# Patient Record
Sex: MALE | Race: BLACK OR AFRICAN AMERICAN | NOT HISPANIC OR LATINO | Employment: FULL TIME | URBAN - METROPOLITAN AREA
[De-identification: names, ages, dates, MRNs, and addresses within clinical notes are randomized per-mention and may not be internally consistent; named-entity substitution may affect disease eponyms.]

---

## 2021-05-22 ENCOUNTER — APPOINTMENT (EMERGENCY)
Dept: ULTRASOUND IMAGING | Facility: HOSPITAL | Age: 56
End: 2021-05-22
Payer: COMMERCIAL

## 2021-05-22 ENCOUNTER — HOSPITAL ENCOUNTER (EMERGENCY)
Facility: HOSPITAL | Age: 56
Discharge: HOME | End: 2021-05-22
Attending: EMERGENCY MEDICINE | Admitting: EMERGENCY MEDICINE
Payer: COMMERCIAL

## 2021-05-22 VITALS
WEIGHT: 252.87 LBS | SYSTOLIC BLOOD PRESSURE: 135 MMHG | BODY MASS INDEX: 32.45 KG/M2 | OXYGEN SATURATION: 97 % | DIASTOLIC BLOOD PRESSURE: 77 MMHG | RESPIRATION RATE: 16 BRPM | HEART RATE: 72 BPM | TEMPERATURE: 99 F | HEIGHT: 74 IN

## 2021-05-22 DIAGNOSIS — M79.605 LEFT LEG PAIN: Primary | ICD-10-CM

## 2021-05-22 PROCEDURE — 99282 EMERGENCY DEPT VISIT SF MDM: CPT | Performed by: EMERGENCY MEDICINE

## 2021-05-22 PROCEDURE — 93971 EXTREMITY STUDY: CPT

## 2021-05-22 PROCEDURE — 99284 EMERGENCY DEPT VISIT MOD MDM: CPT

## 2021-05-22 PROCEDURE — 93971 EXTREMITY STUDY: CPT | Performed by: SURGERY

## 2021-05-22 RX ORDER — METFORMIN HYDROCHLORIDE 750 MG/1
750 TABLET, EXTENDED RELEASE ORAL DAILY
COMMUNITY
Start: 2021-04-05 | End: 2022-04-05

## 2021-05-22 RX ORDER — LANCETS
EACH MISCELLANEOUS
COMMUNITY

## 2021-05-22 RX ORDER — EMPAGLIFLOZIN AND LINAGLIPTIN 25; 5 MG/1; MG/1
TABLET, FILM COATED ORAL
COMMUNITY
Start: 2020-12-15

## 2021-05-22 NOTE — ED PROVIDER NOTES
History  Chief Complaint   Patient presents with    Leg Swelling     C/o posterior left calf pain/swelling starting one week ago  Referred by urgent care for r/o DVT  56 y/o male presents today reporting left calf pain for one week  States he doesn't remember a distinct injury but thought he pulled something  He noted today that it was swollen so he went to urgent care and was referred here for an US to r/o DVT  Denies fever or shortness of breath  History provided by:  Patient  Leg Pain  Location:  Leg  Leg location:  L lower leg  Pain details:     Quality:  Cramping    Radiates to:  Does not radiate    Severity:  Mild    Duration:  1 week    Timing:  Constant    Progression:  Unchanged  Chronicity:  New  Dislocation: no    Prior injury to area:  No  Relieved by:  None tried  Worsened by:  Nothing  Ineffective treatments:  None tried  Associated symptoms: no decreased ROM, no fatigue, no fever, no neck pain, no numbness, no stiffness and no tingling    Risk factors: no concern for non-accidental trauma, no frequent fractures and no known bone disorder        Prior to Admission Medications   Prescriptions Last Dose Informant Patient Reported? Taking? Empagliflozin-linaGLIPtin (Glyxambi) 25-5 MG TABS 5/22/2021 at Unknown time  Yes Yes   Sig: TAKE 1 TABLET BY MOUTH EVERY DAY   Multiple Vitamin (MULTIVITAMIN ADULT PO) 5/22/2021 at Unknown time  Yes Yes   Sig: Take 1 capsule by mouth daily   ONE TOUCH CLUB LANCETS MISC   Yes No   Sig: OneTouch Verio test strips   Olmesartan-amLODIPine-HCTZ (TRIBENZOR PO) 5/22/2021 at Unknown time Self Yes Yes   Sig: Take by mouth   metFORMIN (GLUCOPHAGE-XR) 750 mg 24 hr tablet 5/21/2021 at Unknown time  Yes Yes   Sig: Take 750 mg by mouth daily      Facility-Administered Medications: None       Past Medical History:   Diagnosis Date    Diabetes mellitus ([facility] [address] )     Hypertension        History reviewed  No pertinent surgical history  History reviewed   No pertinent family history  I have reviewed and agree with the history as documented  E-Cigarette/Vaping     E-Cigarette/Vaping Substances     Social History     Tobacco Use    Smoking status: Never Smoker    Smokeless tobacco: Never Used   Substance Use Topics    Alcohol use: Never     Frequency: Never    Drug use: Never       Review of Systems   Constitutional: Negative for chills, fatigue and fever  HENT: Negative for postnasal drip, sore throat and trouble swallowing  Respiratory: Negative for chest tightness and shortness of breath  Gastrointestinal: Negative for abdominal pain  Genitourinary: Negative for dysuria  Musculoskeletal: Negative for neck pain and stiffness  Skin: Negative for rash  Allergic/Immunologic: Negative for immunocompromised state  Neurological: Negative for dizziness, light-headedness and headaches  Psychiatric/Behavioral: Negative for confusion  Physical Exam  Physical Exam  Vitals signs and nursing note reviewed  Constitutional:       Appearance: He is well-developed  HENT:      Head: Normocephalic and atraumatic  Mouth/Throat:      Mouth: Mucous membranes are moist       Pharynx: Uvula midline  Tonsils: No tonsillar exudate  Eyes:      Pupils: Pupils are equal, round, and reactive to light  Neck:      Musculoskeletal: Normal range of motion and neck supple  Cardiovascular:      Rate and Rhythm: Normal rate and regular rhythm  Pulmonary:      Effort: Pulmonary effort is normal       Breath sounds: Normal breath sounds  Abdominal:      General: Bowel sounds are normal       Palpations: Abdomen is soft  Tenderness: There is no abdominal tenderness  There is no guarding or rebound  Musculoskeletal:         General: Tenderness (In the muscle of the left calf ) present  No deformity  Comments: No appreciable swelling in the left lower extremity on my exam   No palpable cord  Neurovascularly intact distally     Skin:     General: Skin is warm and dry  Capillary Refill: Capillary refill takes less than 2 seconds  Neurological:      General: No focal deficit present  Mental Status: He is alert and oriented to person, place, and time  Comments: Patient moving all extremities equally, no focal neuro deficits noted  Psychiatric:         Mood and Affect: Mood normal          Behavior: Behavior normal          Vital Signs  ED Triage Vitals [05/22/21 1618]   Temperature Pulse Respirations Blood Pressure SpO2   99 °F (37 2 °C) 76 16 157/82 98 %      Temp Source Heart Rate Source Patient Position - Orthostatic VS BP Location FiO2 (%)   Oral Monitor Sitting Left arm --      Pain Score       8           Vitals:    05/22/21 1618 05/22/21 1827 05/22/21 1830   BP: 157/82 135/77 135/77   Pulse: 76 75 72   Patient Position - Orthostatic VS: Sitting Lying Lying         Visual Acuity  Visual Acuity      Most Recent Value   L Pupil Size (mm)  3   R Pupil Size (mm)  3          ED Medications  Medications - No data to display    Diagnostic Studies  Results Reviewed     None                 VAS lower limb venous duplex study, unilateral/limited    (Results Pending)              Procedures  Procedures         ED Course                             SBIRT 20yo+      Most Recent Value   SBIRT (24 yo +)   In order to provide better care to our patients, we are screening all of our patients for alcohol and drug use  Would it be okay to ask you these screening questions? Yes Filed at: 05/22/2021 1633   Initial Alcohol Screen: US AUDIT-C    1  How often do you have a drink containing alcohol?  0 Filed at: 05/22/2021 1633   2  How many drinks containing alcohol do you have on a typical day you are drinking? 0 Filed at: 05/22/2021 1633   3a  Male UNDER 65: How often do you have five or more drinks on one occasion? 0 Filed at: 05/22/2021 1633   3b  FEMALE Any Age, or MALE 65+: How often do you have 4 or more drinks on one occassion?   0 Filed at: 05/22/2021 1633   Audit-C Score  0 Filed at: 05/22/2021 1633   SHIRLEY: How many times in the past year have you    Used an illegal drug or used a prescription medication for non-medical reasons? Never Filed at: 05/22/2021 1633                    [facility]  Number of Diagnoses or Management Options  Left leg pain: new and requires workup  Diagnosis management comments: Ultrasound negative for DVT  Likely muscle strain  Recommend stretching, ice, elevation and rest   Follow up with PCP as an outpatient  Return to ED instructions reviewed  Amount and/or Complexity of Data Reviewed  Tests in the radiology section of CPT®: ordered and reviewed    Risk of Complications, Morbidity, and/or Mortality  Presenting problems: low  Diagnostic procedures: low  Management options: low    Patient Progress  Patient progress: stable      Disposition  Final diagnoses:   Left leg pain     Time reflects when diagnosis was documented in both MDM as applicable and the Disposition within this note     Time User Action Codes Description Comment    5/22/2021  7:01 PM Brennen Santamaria [M79 [id number]] Left leg pain       ED Disposition     ED Disposition Condition Date/Time Comment    Discharge Stable Sat May 22, 2021  7:01 PM Taylor Mijares discharge to home/self care              Follow-up Information     Follow up With Specialties Details Why Contact Info Additional [facility] Schedule an appointment as soon as possible for a visit  to get a family doctor if you do not have one [address] [phone]  [address] [place], [place], [address] Emergency Department Emergency Medicine  If symptoms worsen [address] [facility] [address] [place] [address] Emergency Department, [address], [place], [place], [address] Discharge Medication List as of 5/22/2021  7:02 PM      CONTINUE these medications which have NOT CHANGED    Details   Empagliflozin-linaGLIPtin (Glyxambi) 25-5 MG TABS TAKE 1 TABLET BY MOUTH EVERY DAY, Historical Med      metFORMIN (GLUCOPHAGE-XR) 750 mg 24 hr tablet Take 750 mg by mouth daily, Starting Mon 4/5/2021, Until Tue 4/5/2022, Historical Med      Multiple Vitamin (MULTIVITAMIN ADULT PO) Take 1 capsule by mouth daily, Historical Med      Olmesartan-amLODIPine-HCTZ (TRIBENZOR PO) Take by mouth, Historical Med      ONE TOUCH CLUB LANCETS MISC OneTouch Verio test strips, Historical Med           No discharge procedures on file      PDMP Review     None          ED Provider  Electronically Signed by           Kinsey Joseph DO  05/22/21 9897

## 2025-05-20 ENCOUNTER — HOSPITAL ENCOUNTER (OUTPATIENT)
Dept: RADIOLOGY | Facility: HOSPITAL | Age: 60
Discharge: HOME | End: 2025-05-20
Attending: STUDENT IN AN ORGANIZED HEALTH CARE EDUCATION/TRAINING PROGRAM
Payer: COMMERCIAL

## 2025-05-20 ENCOUNTER — OFFICE VISIT (OUTPATIENT)
Dept: OBGYN CLINIC | Facility: CLINIC | Age: 60
End: 2025-05-20
Payer: COMMERCIAL

## 2025-05-20 VITALS — HEIGHT: 74 IN | BODY MASS INDEX: 28.93 KG/M2 | WEIGHT: 225.4 LBS

## 2025-05-20 DIAGNOSIS — M25.511 RIGHT SHOULDER PAIN, UNSPECIFIED CHRONICITY: ICD-10-CM

## 2025-05-20 DIAGNOSIS — M75.81 RIGHT ROTATOR CUFF TENDINITIS: ICD-10-CM

## 2025-05-20 DIAGNOSIS — M75.21 BICEPS TENDONITIS, RIGHT: Primary | ICD-10-CM

## 2025-05-20 DIAGNOSIS — M75.41 SUBACROMIAL IMPINGEMENT OF RIGHT SHOULDER: ICD-10-CM

## 2025-05-20 PROCEDURE — 99204 OFFICE O/P NEW MOD 45 MIN: CPT | Performed by: STUDENT IN AN ORGANIZED HEALTH CARE EDUCATION/TRAINING PROGRAM

## 2025-05-20 PROCEDURE — 73030 X-RAY EXAM OF SHOULDER: CPT

## 2025-05-20 RX ORDER — MELOXICAM 15 MG/1
15 TABLET ORAL DAILY
Qty: 30 TABLET | Refills: 2 | Status: SHIPPED | OUTPATIENT
Start: 2025-05-20 | End: 2025-08-18

## 2025-05-20 NOTE — PROGRESS NOTES
Initial Orthopedic Sports Medicine Office Visit    Assessment:  Dion Mtz is a 60 y.o.  RHD male  with a history and physical examination consistent with right shoulder rotator cuff tendinitis and long head of the biceps tendinitis as well as subacromial impingement.      Plan:   Assessment & Plan  Biceps tendonitis, right  Discussed x ray and physical exam findings of the right shoulder at length with patient in the office today. Discussed physical exam findings consistent with right shoulder long head biceps tendonitis. Discussed conservative treatment with activity modification, RICE therapies, and oral anti-inflammatories as needed for persistent pain and inflammation. Also discussed physical therapy to work on strengthening of the muscles around the shoulder as well as continued progression of range of motion. Patient expressed understanding. Referral placed to PT today. Also discussed Meloxicam for persistent pain and inflammation of the right shoulder. Discussed side effects and risks of medication. Patient agreeable. Prescription and instruction for oral use provided to the patient today. Patient agreeable to the above therapies. He will follow up in 6 weeks for reevaluation of the right shoulder. If no improvement at this time, can consider MRI of the right shoulder for further evaluation of symptoms. All of patients questions were answered in the office today.     Orders:    XR shoulder 2+ vw right; Future    meloxicam (Mobic) 15 mg tablet; Take 1 tablet (15 mg total) by mouth daily    Ambulatory Referral to Physical Therapy; Future    Right rotator cuff tendinitis         Subacromial impingement of right shoulder  I discussed how PT to address his scapular mechanics would help with his subacromial impingement         CC: right shoulder pain    History of Present Illness:  Dion Mtz is a 60 y.o. male  who presents to the office for evaluation of his right shoulder. Patient notes pain to the  anterior aspect of the right shoulder with radiation to the right elbow for the last 6 months. 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 Further, he notes increased pain with lifting. He notes use of tylenol and activity modification with moderate relief of pain. He denies previous surgery, injury, or injection. He denies numbness and tingling of the right upper extremity.     PMHx:   Past Medical History:   Diagnosis Date    Diabetes mellitus (HCC)     Hypertension      PSHx: History reviewed. No pertinent surgical history.  Medications: Medications Ordered Prior to Encounter[1]  Allergies: Allergies[2]   Social History: Employed as a .  Family History: History reviewed. No pertinent family history.   Review of systems: ROS is negative other than that noted in the HPI.  Constitutional: Negative for fatigue and fever.   HENT: Negative for sore throat.    Respiratory: Negative for shortness of breath.    Cardiovascular: Negative for chest pain.   Gastrointestinal: Negative for abdominal pain.   Endocrine: Negative for cold intolerance and heat intolerance.   Genitourinary: Negative for flank pain.   Musculoskeletal: Negative for back pain.   Skin: Negative for rash.   Allergic/Immunologic: Negative for immunocompromised state.   Neurological: Negative for dizziness.   Psychiatric/Behavioral: Negative for agitation.       Comprehensive Physical Examination:  There were no vitals filed for this visit.     General Appearance: The patient is a well developed, well nourished male  in no apparent distress.  Orientation:  The patient is alert and interactive.  Oriented to time, place and person.  Mood and Affect:  The patient has normal mood and affect.    Shoulder focused exam:       RIGHT LEFT    Scapula Atrophy Negative Negative     Winging Negative Negative     Protraction Negative Negative    Rotator cuff SS 5/5 5/5     IS 5/5 5/5     SubS 5/5 5/5    ROM     170      ER0 60 60     ER90 90    90     IR90 45    45     IRb L5     L5    TTP: AC Negative Negative     Biceps Positive Negative     SC Joint Negative Negative     Scapular Spine Negative Negative     Proximal Humerus Negative Negative    Special Tests: O'Briens Positive Negative     Cross body Adduction Negative Negative     Speeds  Positive Negative     Silvino's Positive Negative     Neer Negative Negative     Menezes Positive Negative     Bear Hug Negative Negative    Instability: Apprehension & relocation not tested not tested     Load & shift not tested not tested               UE NV Exam: +2 Radial pulses bilaterally  Sensation intact to light touch C5-T1 bilaterally, Radial/median/ulnar nerve motor intact    Bilateral elbow, wrist, and and forearm ROM full, painless with passive ROM, no ttp or crepitance throughout extremities below shoulder joint    Cervical ROM is full without pain, numbness or tingling. Negative Spurling.    Radiographic Imaging: I personally reviewed and interpreted 4 radiographs of his right shoulder including AP internal rotation, Grashey, axillary lateral, and scapular Y views which were taken in the office and reviewed with the patient in detail.  The shoulder is reduced.  There is no evidence of glenohumeral arthritis.  There is Mild to moderate DJD of his AC joint.  No significant acromiohumeral interval narrowing. No bony pathology is noted to be present.    Scribe Attestation      I,:  Karla Sahu PA-C am acting as a scribe while in the presence of the attending physician.:       I,:  William Waters MD personally performed the services described in this documentation    as scribed in my presence.:                   [1]   Current Outpatient Medications on File Prior to Visit   Medication Sig Dispense Refill    Empagliflozin-linaGLIPtin (Glyxambi) 25-5 MG TABS       Multiple Vitamin (MULTIVITAMIN ADULT PO) Take 1 capsule by mouth in the morning.      Olmesartan-amLODIPine-HCTZ  (TRIBENZOR PO) Take by mouth      ONE TOUCH CLUB LANCETS MISC       metFORMIN (GLUCOPHAGE-XR) 750 mg 24 hr tablet Take 750 mg by mouth daily       No current facility-administered medications on file prior to visit.   [2] No Known Allergies

## 2025-05-20 NOTE — PATIENT INSTRUCTIONS
Take meloxicam 15 mg daily with food or water as needed for pain / inflammation  Take tylenol 1000 mg every 8 hours as needed for pain

## 2025-05-20 NOTE — ASSESSMENT & PLAN NOTE
Discussed x ray and physical exam findings of the right shoulder at length with patient in the office today. Discussed physical exam findings consistent with right shoulder long head biceps tendonitis. Discussed conservative treatment with activity modification, RICE therapies, and oral anti-inflammatories as needed for persistent pain and inflammation. Also discussed physical therapy to work on strengthening of the muscles around the shoulder as well as continued progression of range of motion. Patient expressed understanding. Referral placed to PT today. Also discussed Meloxicam for persistent pain and inflammation of the right shoulder. Discussed side effects and risks of medication. Patient agreeable. Prescription and instruction for oral use provided to the patient today. Patient agreeable to the above therapies. He will follow up in 6 weeks for reevaluation of the right shoulder. If no improvement at this time, can consider MRI of the right shoulder for further evaluation of symptoms. All of patients questions were answered in the office today.     Orders:    XR shoulder 2+ vw right; Future    meloxicam (Mobic) 15 mg tablet; Take 1 tablet (15 mg total) by mouth daily    Ambulatory Referral to Physical Therapy; Future

## 2025-05-27 ENCOUNTER — EVALUATION (OUTPATIENT)
Dept: PHYSICAL THERAPY | Facility: CLINIC | Age: 60
End: 2025-05-27
Attending: STUDENT IN AN ORGANIZED HEALTH CARE EDUCATION/TRAINING PROGRAM
Payer: COMMERCIAL

## 2025-05-27 DIAGNOSIS — M54.12 CERVICAL RADICULOPATHY: Primary | ICD-10-CM

## 2025-05-27 DIAGNOSIS — M75.21 BICEPS TENDONITIS, RIGHT: ICD-10-CM

## 2025-05-27 PROCEDURE — 97162 PT EVAL MOD COMPLEX 30 MIN: CPT | Performed by: PHYSICAL THERAPIST

## 2025-05-27 PROCEDURE — 97112 NEUROMUSCULAR REEDUCATION: CPT | Performed by: PHYSICAL THERAPIST

## 2025-05-27 NOTE — PROGRESS NOTES
PT Evaluation     Today's date: 2025  Patient name: Dion Mtz  : 1965  MRN: [id number]  Referring provider: William Waters MD  Dx:   Encounter Diagnosis     ICD-10-CM    1. Cervical radiculopathy  M54.12       2. Biceps tendonitis, right  M75.21           Start Time: 0730  Stop Time: 08  Total time in clinic (min): 55 minutes    Assessment  Impairments: abnormal muscle firing, abnormal muscle tone, abnormal or restricted ROM, abnormal movement, impaired physical strength, lacks appropriate home exercise program, pain with function and poor posture     Assessment details: Dion Mtz is a pleasant 60 y.o. male who presents with s/s consistent with cervical radiculopathy and R RTC tendinopathy.  The patient's greatest concerns are worry over not knowing what's wrong, concern at no signs of improvement, and fear of not being able to keep active.      No further referral appears necessary at this time based upon examination results.    Primary movement impairment diagnosis of postural dysfunction resulting in pathoanatomical symptoms of Biceps tendonitis, right  (primary encounter diagnosis) and limiting his ability to carry, drive, exercise or recreation, go to work, lift, look up, perform household chores, perform yard work, reach overhead, sit, sleep, turn to look over shoulder, and wash behind the back.    Impairments include:  1) postural dysfunction  2) post capsule tightness  3) weakness in postural and RTC mm  4) neural tension    Etiologic factors include repetitive poor body mechanics.    Discussed risks, benefits, and alternatives to treatment, and answered all patient questions to patient satisfaction.  Understanding of Dx/Px/POC: good     Prognosis: good    Goals  Impairment Goals 4-6 weeks  - Decrease pain to <3/10  - Improve shoulder AROM to equal to the unaffected upper extremity  - Increase shoulder strength to 4+/5 throughout  - Increase scapular strength to 4+/5  throughout    Functional Goals 6-8 weeks  - Return to Prior Level of Function  - Patient will be independent with HEP  - Patient will be able to reach overhead without increased pain/compensation/difficulty  - Patient will be able to drive without increased pain/compensation/difficulty   - Patient will be able to lift with proper mechanics   - Patient will be able to work without increase in discomfort    Plan  Patient would benefit from: skilled physical therapy  Planned modality interventions: cryotherapy, traction and thermotherapy: hydrocollator packs    Planned therapy interventions: abdominal trunk stabilization, behavior modification, body mechanics training, breathing training, flexibility, functional ROM exercises, home exercise program, joint mobilization, manual therapy, massage, Gibbons taping, muscle pump exercises, neuromuscular re-education, patient education, postural training, strengthening, stretching, therapeutic activities, therapeutic exercise and therapeutic training    Frequency: 2x week  Duration in weeks: 8  Treatment plan discussed with: patient  Plan details: Prognosis above is given PT services 2x/week tapering to 1x/week over the next 2 months and home program adherence.        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 Recently, over the past 6-9 months it started bothering his shoulder and arm. In the last 45 days it has gotten progressively worse. He wants to stop being in pain. He saw Dr. PASTRANA who diagnosed him with RTC tendonitis and biceps tendonitis.  PAIN LOCATION/DESCRIPTORS: R shoulder into R forearm. He also has tingling in anterior forearm and upper shoulder  AGGRAVATING FACTORS:   lifting something light, driving, cold, lifting overhead, heavy lifting, looking down or head position, exercising, laying on the arm  EASES: sleeve  DAY PATTERN: worse when he sleeps at night  IMAGING:  x-ray negative  SPECIAL QUESTIONS:    Dion denies a new onset of Dizziness, Dysphagia, Dysarthria, Drop attacks, Diplopia, Nausea, Ataxia, Constant night pain, History of cancer, and Saddle anesthesia .  PATIENT GOALS: Patient wishes to resolve his pain in his arm.    Patient Goals  Patient goals for therapy: decreased pain, increased motion, increased strength, independence with ADLs/IADLs and return to sport/leisure activities    Pain  Current pain ratin  At best pain ratin  At worst pain rating: 10  Location: R arm  Quality: radiating and dull ache  Progression: worsening          Objective     Postural Observations  Seated posture: poor  Standing posture: fair    Additional Postural Observation Details  No pain at rest    Active Range of Motion   Cervical/Thoracic Spine       Cervical    Flexion: 35 degrees  Restriction level: moderate  Extension: 25 degrees     with pain Restriction level: moderate  Left lateral flexion: 30 degrees      Right lateral flexion: 30 degrees      Left rotation: 65 degrees  Right rotation: 60 degrees       Thoracic    Flexion:  WFL  Extension:  with pain Restriction level: moderate  Left rotation:  Restriction level: minimal  Right rotation:  Restriction level: minimal    Additional Active Range of Motion Details  Pain in shoulder with cervical extension    Passive Range of Motion     Right Shoulder   Flexion: WFL  Abduction: 145 degrees with pain  External rotation 90°: WFL  Internal rotation 90°: 35 degrees with pain    Strength/Myotome Testing     Left Shoulder     Planes of Motion   Flexion: 4   Abduction: 4   External rotation at 0°: 4     Right Shoulder     Planes of Motion   Flexion: 4- (pain in shoulder)   Abduction: 4-   External rotation at 0°: 4- (pain)      Isolated Muscles   Lower trapezius: 4   Middle deltoid: 4- (asterisks pain)   Upper trapezius: 4+     Left Elbow   Flexion: 4  Extension: 4    Right Elbow   Flexion: 4+  Extension: 4+    Left Wrist/Hand   Wrist extension: 4  Wrist flexion: 4  Thumb extension: 4    Right Wrist/Hand   Wrist extension: 4  Wrist flexion: 4  Thumb extension: 4    Tests   Cervical   Positive vertical compression and cervical distraction test.  Negative alar ligament test and Sharp-Bob test.     Left   Positive Spurling's Test A.     Right   Positive Spurling's Test A.     Left Shoulder   Negative ULTT1.     Right Shoulder   Positive Hawkin's, Neer's, passive horizontal adduction, ULTT1 and Peng's.   Negative painful arc, Speed's, ULTT3, ULTT4 and Yergason's.     Lumbar   Positive vertical compression .               POC Expires Auth Status Start Date Expiration Date PT Visit Limit     No auth      Date        Used        Remaining           Diagnosis: cervical radiculopathy, RTC tendinopathy   Precautions:    Primary Goals: 1) postural dysfunction  2) post capsule tightness  3) weakness in postural and RTC mm  4) neural tension   *asterisks by exercise = given for HEP   Manuals 5/27       PROM shoulder     DO FOTO   Post shoudler mobs        Cervical mobs                        There Ex        UBE        Post shoulder S        Sleeper S        Doorway S        Median nerve glide        UT S        Levator S        First rib self mob with belt                Neuro Re-Ed        Scap retraction* 5x 5sec       Chin tucks* 2 sec x 5       Prone ITY        Chin tuck with OP into ext        TB row/ext        SL series        TB ER/IR                                        Patient education Diagnosis, prognosis, activity modification, towel roll for posture with sitting        Re-evaluation              Ther Act                                         Modalities

## 2025-05-29 ENCOUNTER — OFFICE VISIT (OUTPATIENT)
Dept: PHYSICAL THERAPY | Facility: CLINIC | Age: 60
End: 2025-05-29
Attending: STUDENT IN AN ORGANIZED HEALTH CARE EDUCATION/TRAINING PROGRAM
Payer: COMMERCIAL

## 2025-05-29 DIAGNOSIS — M75.21 BICEPS TENDONITIS, RIGHT: ICD-10-CM

## 2025-05-29 DIAGNOSIS — M54.12 CERVICAL RADICULOPATHY: Primary | ICD-10-CM

## 2025-05-29 PROCEDURE — 97140 MANUAL THERAPY 1/> REGIONS: CPT

## 2025-05-29 PROCEDURE — 97110 THERAPEUTIC EXERCISES: CPT

## 2025-05-29 NOTE — PROGRESS NOTES
Daily Note     Today's date: 2025  Patient name: Dion Mtz  : 1965  MRN: [id number]  Referring provider: William Waters MD  Dx:   Encounter Diagnosis     ICD-10-CM    1. Cervical radiculopathy  M54.12       2. Biceps tendonitis, right  M75.21           Start Time: 0915  Stop Time: 1000  Total time in clinic (min): 45 minutes    Subjective: Patient states the exercises he got at his eval have been helping so he feels he is on the right path.       Objective: See treatment diary below      Assessment: Initiated outlined program. Patient challenged with relaxation to begin session for passive range, but once he stopped guarding he did well. He was able to perform stretches as noted with good tolerance; updated HEP. He notes improved motion and less tightness following session. He is pleased with his progress with just two visits to PT.       Plan: Continue per plan of care.       POC Expires Auth Status Start Date Expiration Date PT Visit Limit     No auth      Date        Used        Remaining           Diagnosis: cervical radiculopathy, R RTC tendinopathy   Precautions:    Primary Goals: 1) postural dysfunction  2) post capsule tightness  3) weakness in postural and RTC mm  4) neural tension   *asterisks by exercise = given for HEP   Manuals       PROM shoulder  KK, PTA    DO FOTO   Post shoudler mobs        Cervical mobs                        There Ex        UBE  2'/2'      Post shoulder S*  3x30 sec       Sleeper S*  3x30 sec       Doorway S        Median nerve glide        UT S*  3x30 sec       Levator S*  3x30 sec       First rib self mob with belt  2x10 5 sec              Neuro Re-Ed        Scap retraction* 5x 5sec       Chin tucks* 2 sec x 5       Prone ITY        Chin tuck with OP into ext        TB row/ext        SL series        TB ER/IR                                        Patient education Diagnosis, prognosis, activity modification, towel roll for posture with sitting  HEP        Re-evaluation              Ther Act                                         Modalities

## 2025-06-04 ENCOUNTER — OFFICE VISIT (OUTPATIENT)
Dept: PHYSICAL THERAPY | Facility: CLINIC | Age: 60
End: 2025-06-04
Attending: STUDENT IN AN ORGANIZED HEALTH CARE EDUCATION/TRAINING PROGRAM
Payer: COMMERCIAL

## 2025-06-04 DIAGNOSIS — M75.21 BICEPS TENDONITIS, RIGHT: ICD-10-CM

## 2025-06-04 DIAGNOSIS — M54.12 CERVICAL RADICULOPATHY: Primary | ICD-10-CM

## 2025-06-04 PROCEDURE — 97112 NEUROMUSCULAR REEDUCATION: CPT

## 2025-06-04 PROCEDURE — 97110 THERAPEUTIC EXERCISES: CPT

## 2025-06-04 NOTE — PROGRESS NOTES
Daily Note     Today's date: 2025  Patient name: Dion Mtz  : 1965  MRN: [id number]  Referring provider: William Waters MD  Dx:   Encounter Diagnosis     ICD-10-CM    1. Cervical radiculopathy  M54.12       2. Biceps tendonitis, right  M75.21           Start Time: 0700  Stop Time: 07  Total time in clinic (min): 42 minutes    Subjective: Patient states he has been feeling much better. He states he has a little bit of pain down the front of his arm but nothing like before.       Objective: See treatment diary below      Assessment: Continued with outlined program. Focused on isolated strengthening this visit. He did have some tingling in his R arm following SL flexion, but chin tucks did resolve this. He notes soreness with band exercises but no pain. He is able to rest when symptoms arise and they resolve quickly. Will continue to assess and progress within tolerance.       Plan: Continue per plan of care.         POC Expires Auth Status Start Date Expiration Date PT Visit Limit     No auth      Date        Used        Remaining           Diagnosis: cervical radiculopathy, R RTC tendinopathy   Precautions:    Primary Goals: 1) postural dysfunction  2) post capsule tightness  3) weakness in postural and RTC mm  4) neural tension   *asterisks by exercise = given for HEP   Manuals      PROM shoulder  KK, PTA    DO FOTO   Post shoudler mobs   NV     Cervical mobs   NV     First rib mob R    NV             There Ex        UBE  2'/2' 2'/2'     Post shoulder S*  3x30 sec  3x30 sec      Sleeper S*  3x30 sec       Doorway S   At 90* 3x30 sec      Median nerve glide        UT S*  3x30 sec  3x30 sec at end of session     Levator S*  3x30 sec  3x30 sec at end of session     First rib self mob with belt  2x10 5 sec              Neuro Re-Ed        Scap retraction* 5x 5sec       Chin tucks* 2 sec x 5  As needed      Prone ITY        Chin tuck with OP into ext        TB row/ext   GTB 2x10 ea       SL series   ER:2x10  Flex: to 90* 2x10  Abd:2x10      TB ER/IR   GTB 2x10 ea      Scap stab ball on wall   GMB 20x  Up/down, side/side,cw/ccw                             Patient education Diagnosis, prognosis, activity modification, towel roll for posture with sitting HEP        Re-evaluation              Ther Act                                         Modalities

## 2025-06-06 ENCOUNTER — OFFICE VISIT (OUTPATIENT)
Dept: PHYSICAL THERAPY | Facility: CLINIC | Age: 60
End: 2025-06-06
Attending: STUDENT IN AN ORGANIZED HEALTH CARE EDUCATION/TRAINING PROGRAM
Payer: COMMERCIAL

## 2025-06-06 DIAGNOSIS — M75.21 BICEPS TENDONITIS, RIGHT: ICD-10-CM

## 2025-06-06 DIAGNOSIS — M54.12 CERVICAL RADICULOPATHY: Primary | ICD-10-CM

## 2025-06-06 PROCEDURE — 97110 THERAPEUTIC EXERCISES: CPT | Performed by: PHYSICAL THERAPIST

## 2025-06-06 PROCEDURE — 97112 NEUROMUSCULAR REEDUCATION: CPT | Performed by: PHYSICAL THERAPIST

## 2025-06-06 NOTE — PROGRESS NOTES
"Daily Note     Today's date: 2025  Patient name: Dion Mtz  : 1965  MRN: [id number]  Referring provider: William Waters MD  Dx:   Encounter Diagnosis     ICD-10-CM    1. Cervical radiculopathy  M54.12       2. Biceps tendonitis, right  M75.21                      Subjective: The patient reports that he has no pain at the start of the session.  He can tell that the exercises are helping.      Objective: See treatment diary below      Assessment: Tolerated treatment well. Patient demonstrated fatigue post treatment and would benefit from continued PT    The patient was progress with side lying ER and he tolerated this well.  No moneys were also added to strengthen his his RTC and scapular muscles.  The patient did need to be cued to prevent upper trap activation.      Plan: Continue per plan of care.         POC Expires Auth Status Start Date Expiration Date PT Visit Limit     No auth      Date        Used        Remaining           Diagnosis: cervical radiculopathy, R RTC tendinopathy   Precautions:    Primary Goals: 1) postural dysfunction  2) post capsule tightness  3) weakness in postural and RTC mm  4) neural tension   *asterisks by exercise = given for HEP   Manuals     PROM shoulder  KK, PTA    DO FOTO   Post shoudler mobs   NV     Cervical mobs   NV     First rib mob R    NV             There Ex        UBE  2'/2' 2'/2' 2'/2'    Post shoulder S*  3x30 sec  3x30 sec      Sleeper S*  3x30 sec   3x30\"    Doorway S   At 90* 3x30 sec  At 90* 3x30 sec     Median nerve glide        UT S*  3x30 sec  3x30 sec at end of session 3x30\"ea    Levator S*  3x30 sec  3x30 sec at end of session 3x30\"ea    First rib self mob with belt  2x10 5 sec              Neuro Re-Ed        Scap retraction* 5x 5sec       Chin tucks* 2 sec x 5  As needed      Prone ITY        Chin tuck with OP into ext        TB row/ext   GTB 2x10 ea  GTB 2x10ea    SL series   ER:2x10  Flex: to 90* 2x10  Abd:2x10  " ER:2x10 2#  Flex: to 90* 2x10  Abd:2x10     TB ER/IR   GTB 2x10 ea  GTB 2x10ea    Scap stab ball on wall   GMB 20x  Up/down, side/side,cw/ccw GMB 20x  Up/down, side/side,cw/ccw    No money    GTB x20                    Patient education Diagnosis, prognosis, activity modification, towel roll for posture with sitting HEP        Re-evaluation              Ther Act                                         Modalities

## 2025-06-09 ENCOUNTER — OFFICE VISIT (OUTPATIENT)
Dept: PHYSICAL THERAPY | Facility: CLINIC | Age: 60
End: 2025-06-09
Attending: STUDENT IN AN ORGANIZED HEALTH CARE EDUCATION/TRAINING PROGRAM
Payer: COMMERCIAL

## 2025-06-09 DIAGNOSIS — M75.21 BICEPS TENDONITIS, RIGHT: ICD-10-CM

## 2025-06-09 DIAGNOSIS — M54.12 CERVICAL RADICULOPATHY: Primary | ICD-10-CM

## 2025-06-09 PROCEDURE — 97140 MANUAL THERAPY 1/> REGIONS: CPT

## 2025-06-09 PROCEDURE — 97112 NEUROMUSCULAR REEDUCATION: CPT

## 2025-06-09 PROCEDURE — 97110 THERAPEUTIC EXERCISES: CPT

## 2025-06-09 NOTE — PROGRESS NOTES
"Daily Note     Today's date: 2025  Patient name: Dion Mtz  : 1965  MRN: [id number]  Referring provider: William Waters MD  Dx:   Encounter Diagnosis     ICD-10-CM    1. Cervical radiculopathy  M54.12       2. Biceps tendonitis, right  M75.21           Start Time: 0747  Stop Time: 08  Total time in clinic (min): 43 minutes    Subjective: Patient reports feeling much better.      Objective: See treatment diary below      Assessment: Tolerated treatment well.  Worked on postural training as outlined below. Employed cervical and 1st rib mobs, tight with both mobilizations, tolerated well.  Doing well as marked by 5th visit FOTO score.  Patient demonstrated fatigue post treatment, exhibited good technique with therapeutic exercises, and would benefit from continued PT      Plan: Continue per plan of care.         POC Expires Auth Status Start Date Expiration Date PT Visit Limit     No auth      Date        Used        Remaining           Diagnosis: cervical radiculopathy, R RTC tendinopathy   Precautions:    Primary Goals: 1) postural dysfunction  2) post capsule tightness  3) weakness in postural and RTC mm  4) neural tension   *asterisks by exercise = given for HEP   Manuals    PROM shoulder  KK, PTA    DO FOTO (completed) JW    R UE JW   Post shoudler mobs   NV     Cervical mobs   NV  Upglide Gr: II JW   First rib mob R    NV  JW Grad III           There Ex        UBE  2'/2' 2'/2' 2'/2' 2'/2'   Post shoulder S*  3x30 sec  3x30 sec      Sleeper S*  3x30 sec   3x30\"    Doorway S   At 90* 3x30 sec  At 90* 3x30 sec     Median nerve glide        UT S*  3x30 sec  3x30 sec at end of session 3x30\"ea    Levator S*  3x30 sec  3x30 sec at end of session 3x30\"ea    First rib self mob with belt  2x10 5 sec              Neuro Re-Ed        Scap retraction* 5x 5sec       Chin tucks* 2 sec x 5  As needed      Prone ITY     X6 ea BW   Chin tuck with OP into ext        TB row/ext   GTB " 2x10 ea  GTB 2x10ea    SL series   ER:2x10  Flex: to 90* 2x10  Abd:2x10  ER:2x10 2#  Flex: to 90* 2x10  Abd:2x10  ER:2x10 2#  Flex: to 90* 2x10  Abd:2x10    TB ER/IR   GTB 2x10 ea  GTB 2x10ea    Scap stab ball on wall   GMB 20x  Up/down, side/side,cw/ccw GMB 20x  Up/down, side/side,cw/ccw GMB 20x  Up/down, side/side,cw/ccw   No money    GTB x20 BTB x20   Supine Tb abduction with Overhead reach     x8           Patient education Diagnosis, prognosis, activity modification, towel roll for posture with sitting HEP        Re-evaluation              Ther Act                                         Modalities

## 2025-06-12 ENCOUNTER — OFFICE VISIT (OUTPATIENT)
Dept: PHYSICAL THERAPY | Facility: CLINIC | Age: 60
End: 2025-06-12
Attending: STUDENT IN AN ORGANIZED HEALTH CARE EDUCATION/TRAINING PROGRAM
Payer: COMMERCIAL

## 2025-06-12 DIAGNOSIS — M54.12 CERVICAL RADICULOPATHY: Primary | ICD-10-CM

## 2025-06-12 DIAGNOSIS — M75.21 BICEPS TENDONITIS, RIGHT: ICD-10-CM

## 2025-06-12 PROCEDURE — 97112 NEUROMUSCULAR REEDUCATION: CPT

## 2025-06-12 PROCEDURE — 97140 MANUAL THERAPY 1/> REGIONS: CPT

## 2025-06-12 PROCEDURE — 97110 THERAPEUTIC EXERCISES: CPT

## 2025-06-12 NOTE — PROGRESS NOTES
"Daily Note     Today's date: 2025  Patient name: Dion Mtz  : 1965  MRN: [id number]  Referring provider: William Waters MD  Dx:   Encounter Diagnosis     ICD-10-CM    1. Cervical radiculopathy  M54.12       2. Biceps tendonitis, right  M75.21           Start Time: 07  Stop Time: 745  Total time in clinic (min): 39 minutes    Subjective: Patient states he is feeling much better. He states he does have a twinge every now and then at night but he does his exercises and they help. He states he is finally able to climb without pain. He states his shoulder is getting better too that he notices less pain.       Objective: See treatment diary below      Assessment:Continued with outlined program. Patient responded well to strengthening exercises performed. He was cued for scap retraction with movement which is challenging for patient. He was able to perform resisted postural exercises with mod to max muscle fatigue. He states he is sore post session but has no pain.       Plan: Continue per plan of care.         POC Expires Auth Status Start Date Expiration Date PT Visit Limit     No auth      Date        Used        Remaining           Diagnosis: cervical radiculopathy, R RTC tendinopathy   Precautions:    Primary Goals: 1) postural dysfunction  2) post capsule tightness  3) weakness in postural and RTC mm  4) neural tension   *asterisks by exercise = given for HEP   Manuals    PROM shoulder KK, PTA  KK, PTA    DO FOTO (completed) JW    R UE JW   Post shoudler mobs   NV     Cervical mobs   NV  Upglide Gr: II JW   First rib mob R    NV  JW Grad III           There Ex        UBE  2'/2' 2'/2' 2'/2' 2'/2'   Post shoulder S* 3x30 sec  3x30 sec  3x30 sec      Sleeper S*  3x30 sec   3x30\"    Doorway S 3x30 sec   At 90* 3x30 sec  At 90* 3x30 sec     Median nerve glide        UT S*  3x30 sec  3x30 sec at end of session 3x30\"ea    Levator S*  3x30 sec  3x30 sec at end of session " "3x30\"ea    First rib self mob with belt  2x10 5 sec              Neuro Re-Ed        Scap retraction*        Chin tucks*   As needed      Prone ITY     X6 ea BW   SA punches 10# 2x10 jay        Chin tuck with OP into ext        TB row/ext [place]  Rows:20# 2x10   Ext:10# 2x10   GTB 2x10 ea  GTB 2x10ea    SL series ER:2# 2x10   Flex: 2x10   Abd:2# 2x10   ER:2x10  Flex: to 90* 2x10  Abd:2x10  ER:2x10 2#  Flex: to 90* 2x10  Abd:2x10  ER:2x10 2#  Flex: to 90* 2x10  Abd:2x10    TB ER/IR   GTB 2x10 ea  GTB 2x10ea    Scap stab ball on wall   GMB 20x  Up/down, side/side,cw/ccw GMB 20x  Up/down, side/side,cw/ccw GMB 20x  Up/down, side/side,cw/ccw   No money BTB 2x10    GTB x20 BTB x20   Jay h-abd  BTB 2x10        Supine Tb abduction with Overhead reach     x8   Scap stab with tband  GTB 30 sec x4               Patient education  HEP        Re-evaluation             Ther Act                                      Modalities                                                                  "

## 2025-06-16 ENCOUNTER — OFFICE VISIT (OUTPATIENT)
Dept: PHYSICAL THERAPY | Facility: CLINIC | Age: 60
End: 2025-06-16
Attending: STUDENT IN AN ORGANIZED HEALTH CARE EDUCATION/TRAINING PROGRAM
Payer: COMMERCIAL

## 2025-06-16 DIAGNOSIS — M54.12 CERVICAL RADICULOPATHY: Primary | ICD-10-CM

## 2025-06-16 DIAGNOSIS — M75.21 BICEPS TENDONITIS, RIGHT: ICD-10-CM

## 2025-06-16 PROCEDURE — 97110 THERAPEUTIC EXERCISES: CPT | Performed by: PHYSICAL THERAPIST

## 2025-06-16 PROCEDURE — 97112 NEUROMUSCULAR REEDUCATION: CPT | Performed by: PHYSICAL THERAPIST

## 2025-06-16 PROCEDURE — 97140 MANUAL THERAPY 1/> REGIONS: CPT | Performed by: PHYSICAL THERAPIST

## 2025-06-16 NOTE — PROGRESS NOTES
"Daily Note     Today's date: 2025  Patient name: Dion Mtz  : 1965  MRN: [id number]  Referring provider: William Waters MD  Dx:   Encounter Diagnosis     ICD-10-CM    1. Cervical radiculopathy  M54.12       2. Biceps tendonitis, right  M75.21           Start Time: 0700  Stop Time: 0740  Total time in clinic (min): 40 minutes    Subjective: Patient reports he is feeling much better overall. He has some off days still.    Objective: See treatment diary below    Assessment: Tolerated treatment well and focus was on shoulder and postural strengthening. His posture has improved since IE, and he is able to list several exercises and strategies to maintain upright posture at work and driving. Patient demonstrated fatigue post treatment and responded well to first rib mobilization. Added this to HEP.    Plan: Continue per plan of care.         POC Expires Auth Status Start Date Expiration Date PT Visit Limit     No auth      Date        Used        Remaining           Diagnosis: cervical radiculopathy, R RTC tendinopathy   Precautions:    Primary Goals: 1) postural dysfunction  2) post capsule tightness  3) weakness in postural and RTC mm  4) neural tension   *asterisks by exercise = given for HEP   Manuals  6   PROM shoulder KK, PTA  IM, PT   DO FOTO (completed) JW    R UE JW   Post shoudler mobs   NV     Cervical mobs   NV  Upglide Gr: II JW   First rib mob R   IM, PT NV  JW Grad III           There Ex        UBE  2'/2' 2'/2' 2'/2' 2'/2'   Post shoulder S* 3x30 sec  3x30 sec 3x30 sec      Sleeper S*    3x30\"    Doorway S 3x30 sec  3x30 sec At 90* 3x30 sec  At 90* 3x30 sec     Median nerve glide        UT S*   3x30 sec at end of session 3x30\"ea    Levator S*   3x30 sec at end of session 3x30\"ea    First rib self mob with belt  10x10 sec              Neuro Re-Ed        Scap retraction*        Chin tucks*   As needed      Prone ITY     X6 ea BW   SA punches 10# 2x10 [place]  10# 2x10 " jay 2 sec hold      Chin tuck with OP into ext        TB row/ext [place]  Rows:20# 2x10   Ext:10# 2x10  [place]   Rows:20# 3x10 - up NV  Ext:10# 3x10  GTB 2x10 ea  GTB 2x10ea    SL series ER:2# 2x10   Flex: 2x10   Abd:2# 2x10   ER:2x10  Flex: to 90* 2x10  Abd:2x10  ER:2x10 2#  Flex: to 90* 2x10  Abd:2x10  ER:2x10 2#  Flex: to 90* 2x10  Abd:2x10    TB ER/IR  BTB 2x10 ea way GTB 2x10 ea  GTB 2x10ea    Scap stab ball on wall   GMB 20x  Up/down, side/side,cw/ccw GMB 20x  Up/down, side/side,cw/ccw GMB 20x  Up/down, side/side,cw/ccw   No money BTB 2x10  BTB 2x10   GTB x20 BTB x20   Jay h-abd  BTB 2x10  BTB 2x10       Supine Tb abduction with Overhead reach     x8   Scap stab with tband  GTB 30 sec x4               Patient education         Re-evaluation            Ther Act                                   Modalities

## 2025-06-19 ENCOUNTER — OFFICE VISIT (OUTPATIENT)
Dept: PHYSICAL THERAPY | Facility: CLINIC | Age: 60
End: 2025-06-19
Attending: STUDENT IN AN ORGANIZED HEALTH CARE EDUCATION/TRAINING PROGRAM
Payer: COMMERCIAL

## 2025-06-19 DIAGNOSIS — M54.12 CERVICAL RADICULOPATHY: Primary | ICD-10-CM

## 2025-06-19 DIAGNOSIS — M75.21 BICEPS TENDONITIS, RIGHT: ICD-10-CM

## 2025-06-19 PROCEDURE — 97140 MANUAL THERAPY 1/> REGIONS: CPT

## 2025-06-19 PROCEDURE — 97112 NEUROMUSCULAR REEDUCATION: CPT

## 2025-06-19 PROCEDURE — 97110 THERAPEUTIC EXERCISES: CPT

## 2025-06-19 NOTE — PROGRESS NOTES
"Daily Note     Today's date: 2025  Patient name: Dion Mtz  : 1965  MRN: [id number]  Referring provider: William Waters MD  Dx:   Encounter Diagnosis     ICD-10-CM    1. Cervical radiculopathy  M54.12       2. Biceps tendonitis, right  M75.21                      Subjective: Pt reports this is one of the best weeks he's had occasional discomfort if he's in a cold environment.       Objective: See treatment diary below      Assessment: Verbal and tactile cues to correct TE form, reports no increased pain during or post tx.       Plan: Continue per plan of care.         POC Expires Auth Status Start Date Expiration Date PT Visit Limit     No auth      Date        Used        Remaining           Diagnosis: cervical radiculopathy, R RTC tendinopathy   Precautions:    Primary Goals: 1) postural dysfunction  2) post capsule tightness  3) weakness in postural and RTC mm  4) neural tension   *asterisks by exercise = given for HEP   Manuals    PROM shoulder KK, PTA  IM, PT HA, PTA  DO FOTO (completed) JW    R UE JW   Post shoudler mobs        Cervical mobs     Upglide Gr: II JW   First rib mob R   IM, PT   JW Grad III           There Ex        UBE  2'/2' 2'/2' 2'/2' 2'/2'   Post shoulder S* 3x30 sec  3x30 sec 3x30\"     Sleeper S*    3x30\"    Doorway S 3x30 sec  3x30 sec  At 90* 3x30 sec     Median nerve glide        UT S*    3x30\"ea    Levator S*    3x30\"ea    First rib self mob with belt  10x10 sec 10\"x10             Neuro Re-Ed        Scap retraction*        Chin tucks*        Prone ITY     X6 ea BW   SA punches 10# 2x10 candace  10# 2x10 candace 2 sec hold 10# 2x10 candace 2\" hold     Chin tuck with OP into ext        TB row/ext [place]  Rows:20# 2x10   Ext:10# 2x10  [place]   Rows:20# 3x10 - up NV  Ext:10# 3x10  [place]   Rows 25# 3x10   Ext 10# 3x10 GTB 2x10ea    SL series ER:2# 2x10   Flex: 2x10   Abd:2# 2x10    ER:2x10 2#  Flex: to 90* 2x10  Abd:2x10  ER:2x10 2#  Flex: to 90* " 2x10  Abd:2x10    TB ER/IR  BTB 2x10 ea way  GTB 2x10ea    Scap stab ball on wall    GMB 20x  Up/down, side/side,cw/ccw GMB 20x  Up/down, side/side,cw/ccw   No money BTB 2x10  BTB 2x10  BTB 2x10 GTB x20 BTB x20   Jay h-abd  BTB 2x10  BTB 2x10  BTB 2x10     Supine Tb abduction with Overhead reach     x8   Scap stab with tband  GTB 30 sec x4               Patient education         Re-evaluation           Ther Act                                Modalities

## 2025-06-23 ENCOUNTER — EVALUATION (OUTPATIENT)
Dept: PHYSICAL THERAPY | Facility: CLINIC | Age: 60
End: 2025-06-23
Attending: STUDENT IN AN ORGANIZED HEALTH CARE EDUCATION/TRAINING PROGRAM
Payer: COMMERCIAL

## 2025-06-23 DIAGNOSIS — M75.21 BICEPS TENDONITIS, RIGHT: ICD-10-CM

## 2025-06-23 DIAGNOSIS — M54.12 CERVICAL RADICULOPATHY: Primary | ICD-10-CM

## 2025-06-23 PROCEDURE — 97112 NEUROMUSCULAR REEDUCATION: CPT | Performed by: PHYSICAL THERAPIST

## 2025-06-23 NOTE — PROGRESS NOTES
PT Re-Evaluation     Today's date: 2025  Patient name: Dion Mtz  : 1965  MRN: [id number]  Referring provider: William Waters MD  Dx:   Encounter Diagnosis     ICD-10-CM    1. Cervical radiculopathy  M54.12       2. Biceps tendonitis, right  M75.21             Start Time: 0700  Stop Time: 0743  Total time in clinic (min): 43 minutes    Assessment  Impairments: abnormal muscle firing, abnormal muscle tone, abnormal or restricted ROM, abnormal movement, impaired physical strength, lacks appropriate home exercise program, pain with function and poor posture     Assessment details: Dion Mtz is a pleasant 60 y.o. male who presents to RE with s/s consistent with cervical radiculopathy and R RTC tendinopathy.  He is making steady progress with strength, mobility, and function since IE, but continues to require skilled PT to address weakness, posture, and functional mobility.    Primary movement impairment diagnosis of postural dysfunction resulting in pathoanatomical symptoms of Biceps tendonitis, right  (primary encounter diagnosis) and limiting his ability to carry, drive, exercise or recreation, go to work, lift, look up, perform household chores, perform yard work, reach overhead, sit, sleep, turn to look over shoulder, and wash behind the back.    Impairments include:  1) postural dysfunction  2) post capsule tightness  3) weakness in postural and RTC mm  4) neural tension    Etiologic factors include repetitive poor body mechanics.    Discussed risks, benefits, and alternatives to treatment, and answered all patient questions to patient satisfaction.  Understanding of Dx/Px/POC: good     Prognosis: good    Goals  Impairment Goals 4-6 weeks  - Decrease pain to <3/10 - partially met  - Improve shoulder AROM to equal to the unaffected upper extremity - met  - Increase shoulder strength to 4+/5 throughout - partially met  - Increase scapular strength to 4+/5 throughout - partially  met    Functional Goals 6-8 weeks  - Return to Prior Level of Function - partially met  - Patient will be independent with HEP - met  - Patient will be able to reach overhead without increased pain/compensation/difficulty - met  - Patient will be able to drive without increased pain/compensation/difficulty - met, considering AC  - Patient will be able to lift with proper mechanics  - partially met  - Patient will be able to work without increase in discomfort - partially met    Plan  Patient would benefit from: skilled physical therapy    Planned therapy interventions: abdominal trunk stabilization, behavior modification, body mechanics training, breathing training, flexibility, functional ROM exercises, home exercise program, joint mobilization, manual therapy, massage, Gibbons taping, muscle pump exercises, neuromuscular re-education, patient education, postural training, strengthening, stretching, therapeutic activities, therapeutic exercise and therapeutic training    Frequency: 2x week  Duration in weeks: 4  Treatment plan discussed with: patient  Plan details: Prognosis above is given PT services 2x/week tapering to 1x/week over the next month and home program adherence.        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 Recently, over the past 6-9 months it started bothering his shoulder and arm. In the last 45 days it has gotten progressively worse. He wants to stop being in pain. He saw Dr. PASTRANA who diagnosed him with RTC tendonitis and biceps tendonitis.    Update:  Patient reports that he feels much better since starting PT. His sleep is better. His exercises at home really help. When he does have discomfort he  does the stretches and it improves. He does have pain some days but it is not nearly as intense and not as often.   PAIN LOCATION/DESCRIPTORS: R shoulder into R forearm. Tingling in anterior forearm and upper arm less often than ;ast RE  AGGRAVATING FACTORS: cold, random movements  Improved: lifting something light, driving, lifting overhead, heavy lifting, looking down or head position, exercising, laying on the arm  EASES: sleeve  DAY PATTERN: not consistent  IMAGING:  x-ray negative  SPECIAL QUESTIONS:    Dion denies a new onset of Dizziness, Dysphagia, Dysarthria, Drop attacks, Diplopia, Nausea, Ataxia, Constant night pain, History of cancer, and Saddle anesthesia .  PATIENT GOALS: Patient wishes to resolve his pain in his arm. - Patient rates himself at 90% improved    Patient Goals  Patient goals for therapy: decreased pain, increased motion, increased strength, independence with ADLs/IADLs and return to sport/leisure activities    Pain  Current pain ratin  At best pain ratin  At worst pain ratin  Location: R arm  Quality: dull ache  Progression: improved          Objective     Postural Observations  Seated posture: fair  Standing posture: fair    Additional Postural Observation Details  No pain at rest  Uses towel behind his back driving    Active Range of Motion   Cervical/Thoracic Spine       Cervical    Flexion: 40 degrees  WFL  Extension: 40 degrees     WFL  Left lateral flexion: 30 degrees      Right lateral flexion: 30 degrees      Left rotation: 70 degrees  Right rotation: 65 degrees       Thoracic    Flexion:  WFL  Extension:  WFL  Left rotation:  WFL  Right rotation:  WFL    Passive Range of Motion     Right Shoulder   Flexion: WFL  Abduction: WFL  External rotation 90°: WFL  Internal rotation 90°: 55 degrees     Strength/Myotome Testing     Left Shoulder     Planes of Motion   Flexion: 4   Abduction: 4   External rotation at 0°: 4     Right Shoulder     Planes of Motion   Flexion: 4  "(discomfort)   Abduction: 4-   External rotation at 0°: 4 (asterisks sign discomfort)     Isolated Muscles   Lower trapezius: 4 (pain)   Middle deltoid: 4- (asterisks pain)   Upper trapezius: 4+     Left Elbow   Flexion: 4+  Extension: 4+    Right Elbow   Flexion: 4+  Extension: 4+    Left Wrist/Hand   Wrist extension: 4+  Wrist flexion: 4+  Thumb extension: 4+    Right Wrist/Hand   Wrist extension: 4+  Wrist flexion: 4+  Thumb extension: 4+    Tests   Cervical   Positive vertical compression and cervical distraction test.  Negative alar ligament test and Sharp-Bob test.     Left   Positive Spurling's Test A.     Right   Positive Spurling's Test A.     Left Shoulder   Negative ULTT1.     Right Shoulder   Positive Hawkin's and passive horizontal adduction.   Negative Neer's, painful arc, Speed's, ULTT1, ULTT3, ULTT4, Yergason's and Peng's.     Lumbar   Positive vertical compression .               POC Expires Auth Status Start Date Expiration Date PT Visit Limit     No auth      Date        Used        Remaining           Diagnosis: cervical radiculopathy, R RTC tendinopathy   Precautions:    Primary Goals: 1) postural dysfunction  2) post capsule tightness  3) weakness in postural and RTC mm  4) neural tension   *asterisks by exercise = given for HEP   Manuals 6/12 6/16 6/19 6/23 6/9   PROM shoulder KK, PTA  IM, PT HA, PTA  DO FOTO (completed) JW    R UE JW   Post shoudler mobs        Cervical mobs     Upglide Gr: II JW   First rib mob R   IM, PT   JW Grad III           There Ex        UBE  2'/2' 2'/2' 2'/2' 2'/2'   Post shoulder S* 3x30 sec  3x30 sec 3x30\"     Sleeper S*        Doorway S 3x30 sec  3x30 sec      Median nerve glide        UT S*        Levator S*        First rib self mob with belt  10x10 sec 10\"x10 10\"x10            Neuro Re-Ed        Scap retraction*        Chin tucks*        Prone ITY     X6 ea BW   SA punches 10# 2x10 [place]  10# 2x10 candace 2 sec hold 10# 2x10 candace 2\" hold     Chin tuck with OP into " ext        TB row/ext [place]  Rows:20# 2x10   Ext:10# 2x10  [place]   Rows:20# 3x10 - up [place]  Ext:10# 3x10  [place]   Rows 25# 3x10   Ext 10# 3x10     SL series ER:2# 2x10   Flex: 2x10   Abd:2# 2x10     ER:2x10 2#  Flex: to 90* 2x10  Abd:2x10    TB ER/IR  BTB 2x10 ea way      Scap stab ball on wall     GMB 20x  Up/down, side/side,cw/ccw   No money BTB 2x10  BTB 2x10  BTB 2x10  BTB x20   Jay h-abd  BTB 2x10  BTB 2x10  BTB 2x10     Supine Tb abduction with Overhead reach     x8   Scap stab with tband  GTB 30 sec x4       Wall clocks    NV    Wall walks with LT reach    NV    D2 flx    Standing OTB 2x10    Prone ITY    NV            Patient education         Re-evaluation    IM, PT      Ther Act                             Modalities

## 2025-06-26 ENCOUNTER — OFFICE VISIT (OUTPATIENT)
Dept: PHYSICAL THERAPY | Facility: CLINIC | Age: 60
End: 2025-06-26
Attending: STUDENT IN AN ORGANIZED HEALTH CARE EDUCATION/TRAINING PROGRAM
Payer: COMMERCIAL

## 2025-06-26 DIAGNOSIS — M54.12 CERVICAL RADICULOPATHY: Primary | ICD-10-CM

## 2025-06-26 DIAGNOSIS — M75.21 BICEPS TENDONITIS, RIGHT: ICD-10-CM

## 2025-06-26 PROCEDURE — 97140 MANUAL THERAPY 1/> REGIONS: CPT

## 2025-06-26 PROCEDURE — 97110 THERAPEUTIC EXERCISES: CPT

## 2025-06-26 PROCEDURE — 97112 NEUROMUSCULAR REEDUCATION: CPT

## 2025-06-26 NOTE — PROGRESS NOTES
"Daily Note     Today's date: 2025  Patient name: Dion Mtz  : 1965  MRN: [id number]  Referring provider: William Waters MD  Dx:   Encounter Diagnosis     ICD-10-CM    1. Cervical radiculopathy  M54.12       2. Biceps tendonitis, right  M75.21           Start Time: 0745  Stop Time: 08  Total time in clinic (min): 40 minutes    Subjective: Patient states he is feeling so much better. He states his pain is like night and day.       Objective: See treatment diary below      Assessment: Continued with outlined program. Patient was able to progress with prone ITY with extensive education and tactile cueing for scap retraction and to avoid UT compensation. He was able to perform scapular stabilization exercises, maximizing fatigue at low trap. He does have some pain with SL flexion using weight, trialed modifications but still notes anterior pain despite adding scapular retraction as well. Will continue to address. He notes increase muscle fatigue this visit with program.       Plan: Continue per plan of care.       POC Expires Auth Status Start Date Expiration Date PT Visit Limit     No auth      Date        Used        Remaining           Diagnosis: cervical radiculopathy, R RTC tendinopathy   Precautions:    Primary Goals: 1) postural dysfunction  2) post capsule tightness  3) weakness in postural and RTC mm  4) neural tension   *asterisks by exercise = given for HEP   Manuals    PROM shoulder KK, PTA  IM, PT HA, PTA  KK, PTA    Post shoudler mobs        Cervical mobs        First rib mob R   IM, PT              There Ex        UBE  2'/2' 2'/2' 2'/2' 2'/2'   Post shoulder S* 3x30 sec  3x30 sec 3x30\"     Sleeper S*        Doorway S 3x30 sec  3x30 sec   3x30 sec    Median nerve glide        UT S*        Levator S*        First rib self mob with belt  10x10 sec 10\"x10 10\"x10            Neuro Re-Ed        Scap retraction*        Chin tucks*        Prone ITY        SA " "punches 10# 2x10 jay  10# 2x10 jay 2 sec hold 10# 2x10 jay 2\" hold     Chin tuck with OP into ext        TB row/ext [place]  Rows:20# 2x10   Ext:10# 2x10  [place]   Rows:20# 3x10 - up [place]  Ext:10# 3x10  [place]   Rows 25# 3x10   Ext 10# 3x10     SL series ER:2# 2x10   Flex: 2x10   Abd:2# 2x10     ER: 2# 3x10  Flex:2# P!  Flex:0# with scap retraction 10x  Abd:2# 3x10    TB ER/IR  BTB 2x10 ea way      Scap stab ball on wall        No money BTB 2x10  BTB 2x10  BTB 2x10  BTB 2x12    Jay h-abd  BTB 2x10  BTB 2x10  BTB 2x10  BTB 2x12   Supine Tb abduction with Overhead reach        Scap stab with tband  GTB 30 sec x4       Wall clocks    NV OTB 5 points 3x jay   Wall walks with LT reach    NV OTB 10x    D2 flx    Standing OTB 2x10    Prone ITY    NV 2x10 ea with scap retraction            Patient education         Re-evaluation    IM, PT     Ther Act                          Modalities                                                        "

## 2025-06-30 ENCOUNTER — OFFICE VISIT (OUTPATIENT)
Dept: PHYSICAL THERAPY | Facility: CLINIC | Age: 60
End: 2025-06-30
Attending: STUDENT IN AN ORGANIZED HEALTH CARE EDUCATION/TRAINING PROGRAM
Payer: COMMERCIAL

## 2025-06-30 DIAGNOSIS — M75.21 BICEPS TENDONITIS, RIGHT: ICD-10-CM

## 2025-06-30 DIAGNOSIS — M54.12 CERVICAL RADICULOPATHY: Primary | ICD-10-CM

## 2025-06-30 PROCEDURE — 97110 THERAPEUTIC EXERCISES: CPT | Performed by: PHYSICAL THERAPIST

## 2025-06-30 PROCEDURE — 97140 MANUAL THERAPY 1/> REGIONS: CPT | Performed by: PHYSICAL THERAPIST

## 2025-06-30 PROCEDURE — 97112 NEUROMUSCULAR REEDUCATION: CPT | Performed by: PHYSICAL THERAPIST

## 2025-06-30 NOTE — PROGRESS NOTES
"Daily Note     Today's date: 2025  Patient name: Dion Mtz  : 1965  MRN: [id number]  Referring provider: William Waters MD  Dx:   Encounter Diagnosis     ICD-10-CM    1. Cervical radiculopathy  M54.12       2. Biceps tendonitis, right  M75.21           Start Time: 0659  Stop Time: 0744  Total time in clinic (min): 45 minutes    Subjective: Patient reports he had a rough evening yesterday with his shoulder with pain in anterior shoulder. He had a hard time sleeping. The exercises are still helping.    Objective: See treatment diary below    Assessment: Adjusted POC to address soreness and pain in R anterior shoulder. Patient did do some lifting of his grandson yesterday and had no pain during, but it came on later. Patient educated about healing timeframes and that flare ups can be a part of the healing process. He tolerated modifications and manuals well, but most exercises had to be deferred.    Plan: Continue per plan of care.         POC Expires Auth Status Start Date Expiration Date PT Visit Limit     No auth      Date        Used        Remaining           Diagnosis: cervical radiculopathy, R RTC tendinopathy   Precautions:    Primary Goals: 1) postural dysfunction  2) post capsule tightness  3) weakness in postural and RTC mm  4) neural tension   *asterisks by exercise = given for HEP   Manuals    PROM shoulder  IM, PT HA, PTA  KK, PTA    Post shoudler mobs        Cervical mobs        First rib mob R   IM, PT      KT tape support IM, PT biceps offloading       IASTM R anterior shoulder IM, PT       There Ex        UBE  2'/2' 2'/2' 2'/2' 2'/2'   Post shoulder S* 3x30 sec 3x30 sec 3x30\"     Sleeper S*        Doorway S  3x30 sec   3x30 sec    Median nerve glide        UT S*        Levator S*        First rib self mob with belt 10x10 sec 10x10 sec 10\"x10 10\"x10    Cane shoulder ext stretch 10x10 sec       Neuro Re-Ed        Scap retraction*        Chin tucks*      " "  Prone ITY        SA punches  10# 2x10 jay 2 sec hold 10# 2x10 jay 2\" hold     Chin tuck with OP into ext        TB row/ext [place]   Rows 25# 3x10   Ext 15# 3x10 [place]   Rows:20# 3x10 - up [place]  Ext:10# 3x10  [place]   Rows 25# 3x10   Ext 10# 3x10     SL series     ER: 2# 3x10  Flex:2# P!  Flex:0# with scap retraction 10x  Abd:2# 3x10    TB ER/IR  BTB 2x10 ea way      Scap stab ball on wall        No money  BTB 2x10  BTB 2x10  BTB 2x12    Jay h-abd   BTB 2x10  BTB 2x10  BTB 2x12   Supine Tb abduction with Overhead reach        Scap stab with tband         Wall clocks    NV OTB 5 points 3x jay   Wall walks with LT reach    NV OTB 10x    D2 flx    Standing OTB 2x10    Prone ITY Bent over at bench   I 5# 2x10   NV 2x10 ea with scap retraction            Patient education         Re-evaluation    IM, PT     Ther Act                          Modalities           10 min ice                                               "

## 2025-07-02 ENCOUNTER — OFFICE VISIT (OUTPATIENT)
Dept: PHYSICAL THERAPY | Facility: CLINIC | Age: 60
End: 2025-07-02
Attending: STUDENT IN AN ORGANIZED HEALTH CARE EDUCATION/TRAINING PROGRAM
Payer: COMMERCIAL

## 2025-07-02 DIAGNOSIS — M75.21 BICEPS TENDONITIS, RIGHT: ICD-10-CM

## 2025-07-02 DIAGNOSIS — M54.12 CERVICAL RADICULOPATHY: Primary | ICD-10-CM

## 2025-07-02 PROCEDURE — 97112 NEUROMUSCULAR REEDUCATION: CPT

## 2025-07-02 PROCEDURE — 97110 THERAPEUTIC EXERCISES: CPT

## 2025-07-02 NOTE — PROGRESS NOTES
"Daily Note     Today's date: 2025  Patient name: Dion Mtz  : 1965  MRN: [id number]  Referring provider: William Waters MD  Dx:   Encounter Diagnosis     ICD-10-CM    1. Cervical radiculopathy  M54.12       2. Biceps tendonitis, right  M75.21           Start Time: 0915  Stop Time: 1000  Total time in clinic (min): 45 minutes    Subjective: Patient states he is feeling much better. He states his arm felt so good he forgot he had the tape.       Objective: See treatment diary below      Assessment: Continued with outlined program. Patient was able to resume exercises as noted. He notes some pain with OH scap stabilization using resistance but when modifying range he has improvement. Patient was able to perform strengthening exercises as noted with good tolerance. Patient deferred tape this visit as he is feeling better, will assess symptoms nv.       Plan: Continue per plan of care.         POC Expires Auth Status Start Date Expiration Date PT Visit Limit     No auth      Date        Used        Remaining           Diagnosis: cervical radiculopathy, R RTC tendinopathy   Precautions:    Primary Goals: 1) postural dysfunction  2) post capsule tightness  3) weakness in postural and RTC mm  4) neural tension   *asterisks by exercise = given for HEP   Manuals    PROM shoulder   WAHL, PTA  KK, PTA    Post shoudler mobs        Cervical mobs        First rib mob R         KT tape support IM, PT biceps offloading [place]       IASTM R anterior shoulder IM, PT       There Ex        UBE   2'/2' 2'/2' 2'/2'   Post shoulder S* 3x30 sec 3x30 sec  3x30\"     Sleeper S*        Doorway S     3x30 sec    Median nerve glide        UT S*        Levator S*        First rib self mob with belt 10x10 sec 10x10 sec  10\"x10 10\"x10    Cane shoulder ext stretch 10x10 sec 10x10 sec       Neuro Re-Ed        Scap retraction*        Chin tucks*        Prone ITY        SA punches   10# 2x10 candace 2\" hold   "   Chin tuck with OP into ext        TB row/ext [place]   Rows 25# 3x10   Ext 15# 3x10 [place]   Rows: 25# 3x10  Ext: 15# 3x10  [place]   Rows 25# 3x10   Ext 10# 3x10     SL series  ER: 2# 3x10    ER: 2# 3x10  Flex:2# P!  Flex:0# with scap retraction 10x  Abd:2# 3x10    TB ER/IR  GTB 2x10 jay       Scap stab ball on wall        No money  BTB 3x10  BTB 2x10  BTB 2x12    Jay h-abd   BTB 3x10  BTB 2x10  BTB 2x12   Supine Tb abduction with Overhead reach        Scap stab with tband   OTB 30 sec x4       Wall clocks  OTB 5 points 5x jay   NV OTB 5 points 3x jay   Wall walks with LT reach  OTB 10x jay   NV OTB 10x    D2 flx    Standing OTB 2x10    Prone ITY Bent over at bench   I 5# 2x10 Bent over at bend I 5# 2x10   NV 2x10 ea with scap retraction            Patient education         Re-evaluation    IM, PT     Ther Act                          Modalities           10 min ice

## 2025-07-03 ENCOUNTER — OFFICE VISIT (OUTPATIENT)
Dept: OBGYN CLINIC | Facility: CLINIC | Age: 60
End: 2025-07-03
Payer: COMMERCIAL

## 2025-07-03 VITALS — BODY MASS INDEX: 29 KG/M2 | WEIGHT: 226 LBS | HEIGHT: 74 IN

## 2025-07-03 DIAGNOSIS — M75.81 RIGHT ROTATOR CUFF TENDINITIS: Primary | ICD-10-CM

## 2025-07-03 DIAGNOSIS — M75.21 BICEPS TENDONITIS, RIGHT: ICD-10-CM

## 2025-07-03 PROCEDURE — 99213 OFFICE O/P EST LOW 20 MIN: CPT | Performed by: STUDENT IN AN ORGANIZED HEALTH CARE EDUCATION/TRAINING PROGRAM

## 2025-07-03 RX ORDER — AMLODIPINE AND OLMESARTAN MEDOXOMIL 10; 40 MG/1; MG/1
1 TABLET ORAL DAILY
COMMUNITY
Start: 2025-04-17 | End: 2025-07-16

## 2025-07-03 NOTE — PROGRESS NOTES
Ortho Sports Medicine Shoulder Follow Up Visit     Assesment:   60 y.o. male right shoulder rotator cuff tendinitis and biceps tendinitis    Plan:    We had a lengthy discussion during which we reviewed his imaging, exam, diagnosis, and treatment options.  We discussed conservative treatments including physical therapy, activity modification, anti-inflammatories, RICE, and injections.  We also discussed surgical options.  Dion has overall had an excellent response to physical therapy, and he is pleased with his progress.  I did discuss the possibility of an MRI if he felt like he had significant symptoms affecting his function quality of life or he is overall pleased with his progress and would like to just continue with physical therapy for now.  After lengthy discussion, the patient elected to begin with a course of conservative treatment.  He will continue with physical therapy to work on rotator cuff and periscapular stabilizer strengthening as well as capsular stretching and range of motion exercises.  They will follow-up in 3-6 months for clinical check.  If any worsening at that time, we will likely order an MRI to further evaluate.  All of his questions were answered in detail. He was encouraged to reach out via "[employer]t if hehas any questions or concerns.    Assessment & Plan  Right rotator cuff tendinitis         Biceps tendonitis, right              Conservative treatment:    Ice to shoulder 1-2 times daily, for 20 minutes at a time.  PT for ROM and strengthening to shoulder, rotator cuff, scapular stabilizers.  Home exercise program for shoulder, including ROM and strenthening.  Instructions given to patient of what exercises to perform.  Let pain guide return to activities.      Imaging:    All imaging from today was reviewed by myself and explained to the patient.       Injection:    No Injection planned at this time.      Surgery:     No surgery is recommended at this point, continue with  conservative treatment plan as noted.      Follow up:    Return in about 3 months (around 10/3/2025).      Chief Complaint   Patient presents with    Right Shoulder - Follow-up     rotator cuff tendinitis and long head of the biceps tendinitis as well as subacromial impingement. F/u from PT         History of Present Illness:    The patient is returns for follow up of his right shoulder.  Since the prior visit, He reports significant improvement.  He reports 90% improvement.  He has been attending physical therapy and is really pleased with his progress.  He has now has minimal pain and weakness, and he has had significant improvement and feels like he is going to continue to get better with physical therapy.  No new injuries or complaints.  Work has been much better formed.  No weakness.  No numbness or tingling.  He has not been taking the meloxicam and has not needed it.  He had a trip planned to [place] but his mother has not been feeling good, and so he is headed to [place] for the Fourth of July weekend to help take care of her.    Shoulder Surgical History:  None    Past Medical, Social and Family History:  Past Medical History[1]  Past Surgical History[2]  Allergies[3]  Medications Ordered Prior to Encounter[4]  Social History     Socioeconomic History    Marital status: /Civil Union     Spouse name: Not on file    Number of children: Not on file    Years of education: Not on file    Highest education level: Not on file   Occupational History    Not on file   Tobacco Use    Smoking status: Never    Smokeless tobacco: Never   Substance and Sexual Activity    Alcohol use: Never    Drug use: Never    Sexual activity: Not on file   Other Topics Concern    Not on file   Social History Narrative    Not on file     Social Drivers of Health     Financial Resource Strain: Unknown (5/22/2024)    [facility]    Financial Insecurity     In the last 12 months, was there a time when you were not  able to pay the mortgage or rent on time?: No     In the last 12 months, how many places have you lived?: 1     In the last 12 months, was there a time when you did not have a steady place to sleep or slept in a shelter (including now)?: No     200% of [employer]: Not on file     Total Household Income (PRAPARE): Not on file     : Not on file   Food Insecurity: No Food Insecurity (5/22/2024)    Received from [facility]    Hunger Vital Sign     Within the past 12 months, you worried that your food would run out before you got the money to buy more.: Never true     Within the past 12 months, the food you bought just didn't last and you didn't have money to get more.: Never true   Transportation Needs: Unknown (5/22/2024)    [facility]     In the last 12 months, was there a time when you were not able to pay the mortgage or rent on time?: No     In the last 12 months, how many places have you lived?: 1     In the last 12 months, was there a time when you did not have a steady place to sleep or slept in a shelter (including now)?: No     : Not on file   Physical Activity: Sufficiently Active (5/22/2024)    [facility]    Exercise Vital Sign     On average, how many days per week do you engage in moderate to strenuous exercise (like a brisk walk)?: 5 days     On average, how many minutes do you engage in exercise at this level?: 40 min   Stress: Unknown (5/22/2024)    [facility]     In the last 12 months, was there a time when you were not able to pay the mortgage or rent on time?: No     In the last 12 months, how many places have you lived?: 1     In the last 12 months, was there a time when you did not have a steady place to sleep or slept in a shelter (including now)?: No     : Not on file   Social Connections: Unknown (10/21/2023)    Received from [facility]    Social Connection and Isolation Panel     In a typical  "week, how many times do you talk on the phone with family, friends, or neighbors?: More than three times a week     Frequency of Social Gatherings with Friends and Family: Not on file     Attends Buddhist Services: Not on file     Active Member of Clubs or Organizations: Not on file     Attends Club or Organization Meetings: Not on file     Marital Status: Not on file   Intimate Partner Violence: Unknown (10/21/2023)    Received from [facility]    Humiliation, Afraid, Rape, and Kick questionnaire     Within the last year, have you been afraid of your partner or ex-partner?: No     Emotionally Abused: Not on file     Physically Abused: Not on file     Sexually Abused: Not on file   Housing Stability: Low Risk  (5/22/2024)    Received from [facility] Vital Sign     Unable to Pay for Housing in the Last Year: No     Number of Places Lived in the Last Year: 1     Unstable Housing in the Last Year: No       I have reviewed the past medical, surgical, social and family history, medications and allergies as documented in the EMR.    Review of systems: ROS is negative other than that noted in the HPI.  Constitutional: Negative for fatigue and fever.      Physical Exam:    Height 6' 2\" (1.88 m), weight 103 kg (226 lb).    General/Constitutional: NAD, well developed, well nourished       Shoulder focused exam:       RIGHT LEFT    Scapula Atrophy Negative Negative     Winging Negative Negative     Protraction Negative Negative    Rotator cuff SS 5/5 5/5     IS 5/5 5/5     SubS 5/5 5/5    AROM     170     ER0 45 45     IRb L-3 L-3    PROM     170     ER0 50    50    TTP: AC Negative Negative     Biceps Negative Negative     SC Joint Negative Negative     Scapular Spine Negative Negative     Proximal Humerus Negative Negative    Special Tests: O'Briens Negative Negative     Cross body Adduction Negative Negative     Speeds  Negative Negative     Silvino's Negative Negative     Menezes " Negative Negative     Bear Hug Negative Negative    Instability: Apprehension & relocation not tested not tested     Load & shift not tested not tested        UE NV Exam: +2 Radial pulses bilaterally  Sensation intact to light touch C5-T1 bilaterally, Radial/median/ulnar nerve motor intact    Cervical ROM is full without pain, numbness or tingling      Shoulder Imaging    No imaging was performed today      Scribe Attestation      I,:   am acting as a scribe while in the presence of the attending physician.:       I,:   personally performed the services described in this documentation    as scribed in my presence.:                  [1]   Past Medical History:  Diagnosis Date    Diabetes mellitus (HCC)     Hypertension    [2] No past surgical history on file.  [3] No Known Allergies  [4]   Current Outpatient Medications on File Prior to Visit   Medication Sig Dispense Refill    amlodipine-olmesartan (LAUREN) 10-40 MG Take 1 tablet by mouth daily      Empagliflozin-linaGLIPtin (Glyxambi) 25-5 MG TABS       Multiple Vitamin (MULTIVITAMIN ADULT PO) Take 1 capsule by mouth in the morning.      Olmesartan-amLODIPine-HCTZ (TRIBENZOR PO) Take by mouth      ONE TOUCH CLUB LANCETS MISC       meloxicam (Mobic) 15 mg tablet Take 1 tablet (15 mg total) by mouth daily 30 tablet 2    metFORMIN (GLUCOPHAGE-XR) 750 mg 24 hr tablet Take 750 mg by mouth daily       No current facility-administered medications on file prior to visit.

## 2025-07-07 ENCOUNTER — OFFICE VISIT (OUTPATIENT)
Dept: PHYSICAL THERAPY | Facility: CLINIC | Age: 60
End: 2025-07-07
Attending: STUDENT IN AN ORGANIZED HEALTH CARE EDUCATION/TRAINING PROGRAM
Payer: COMMERCIAL

## 2025-07-07 DIAGNOSIS — M54.12 CERVICAL RADICULOPATHY: Primary | ICD-10-CM

## 2025-07-07 DIAGNOSIS — M75.21 BICEPS TENDONITIS, RIGHT: ICD-10-CM

## 2025-07-07 PROCEDURE — 97112 NEUROMUSCULAR REEDUCATION: CPT | Performed by: PHYSICAL THERAPIST

## 2025-07-07 PROCEDURE — 97140 MANUAL THERAPY 1/> REGIONS: CPT | Performed by: PHYSICAL THERAPIST

## 2025-07-07 PROCEDURE — 97110 THERAPEUTIC EXERCISES: CPT | Performed by: PHYSICAL THERAPIST

## 2025-07-07 NOTE — PROGRESS NOTES
"Daily Note     Today's date: 2025  Patient name: Dion Mtz  : 1965  MRN: [id number]  Referring provider: William Waters MD  Dx:   Encounter Diagnosis     ICD-10-CM    1. Cervical radiculopathy  M54.12       2. Biceps tendonitis, right  M75.21           Start Time: 0745  Stop Time: 828  Total time in clinic (min): 43 minutes    Subjective: Patient reports that he has been feeling better, about 90% at this time.    Objective: See treatment diary below    Assessment: Modified exercises as needed to avoid discomfort in R shoulder. Patient was educated about progress, and that though it is not linear, his arm is steadily improving over time. He was able to tolerate several exercises well, but noted discomfort with exercises that were easy last visit. Added KT tape for bicep offloading.    Plan: Continue per plan of care.         POC Expires Auth Status Start Date Expiration Date PT Visit Limit     No auth      Date        Used        Remaining           Diagnosis: cervical radiculopathy, R RTC tendinopathy   Precautions:    Primary Goals: 1) postural dysfunction  2) post capsule tightness  3) weakness in postural and RTC mm  4) neural tension   *asterisks by exercise = given for HEP   Manuals    PROM shoulder     KK, PTA    Post shoudler mobs        Cervical mobs        First rib mob R         KT tape support IM, PT biceps offloading Defer  IM, PT biceps offloading     IASTM R anterior shoulder IM, PT       There Ex        UBE   2/2 2'/2' 2'/2'   Post shoulder S* 3x30 sec 3x30 sec  3x30 sec      Sleeper S*        Doorway S     3x30 sec    Median nerve glide        UT S*        Levator S*        First rib self mob with belt 10x10 sec 10x10 sec  10x10 sec 10\"x10    Cane shoulder ext stretch 10x10 sec 10x10 sec  10x10 sec     Neuro Re-Ed        Scap retraction*        Chin tucks*        Prone ITY        SA punches   Cane x 10 AROM     Chin tuck with OP into ext        TB " row/ext [place]   Rows 25# 3x10   Ext 15# 3x10 [place]   Rows: 25# 3x10  Ext: 15# 3x10  [place]   Rows: 25# 4x10  Ext: 15# 4x10     SL series  ER: 2# 3x10  ER: 2# 3x10   ER: 2# 3x10  Flex:2# P!  Flex:0# with scap retraction 10x  Abd:2# 3x10    TB ER/IR  GTB 2x10 jay       Scap stab ball on wall        No money  BTB 3x10  Hold, pain  BTB 2x12    Jay h-abd   BTB 3x10  BT 3x10  BTB 2x12   Supine Tb abduction with Overhead reach        Scap stab with tband   OTB 30 sec x4  OTB 30 sec      Wall clocks  OTB 5 points 5x jay   NV OTB 5 points 3x jay   Wall walks with LT reach  OTB 10x jay   NV OTB 10x    D2 flx    Standing OTB 2x10    Prone ITY Bent over at bench   I 5# 2x10 Bent over at bend I 5# 2x10  Bent over at bench I 5# 2x10  NV 2x10 ea with scap retraction            Patient education         Re-evaluation    IM, PT     Ther Act                          Modalities           10 min ice

## 2025-07-09 ENCOUNTER — OFFICE VISIT (OUTPATIENT)
Dept: PHYSICAL THERAPY | Facility: CLINIC | Age: 60
End: 2025-07-09
Attending: STUDENT IN AN ORGANIZED HEALTH CARE EDUCATION/TRAINING PROGRAM
Payer: COMMERCIAL

## 2025-07-09 DIAGNOSIS — M75.21 BICEPS TENDONITIS, RIGHT: ICD-10-CM

## 2025-07-09 DIAGNOSIS — M54.12 CERVICAL RADICULOPATHY: Primary | ICD-10-CM

## 2025-07-09 PROCEDURE — 97110 THERAPEUTIC EXERCISES: CPT | Performed by: PHYSICAL THERAPIST

## 2025-07-09 PROCEDURE — 97112 NEUROMUSCULAR REEDUCATION: CPT | Performed by: PHYSICAL THERAPIST

## 2025-07-09 PROCEDURE — 97140 MANUAL THERAPY 1/> REGIONS: CPT | Performed by: PHYSICAL THERAPIST

## 2025-07-09 NOTE — PROGRESS NOTES
Daily Note     Today's date: 2025  Patient name: Dion Mtz  : 1965  MRN: [id number]  Referring provider: William Waters MD  Dx:   Encounter Diagnosis     ICD-10-CM    1. Cervical radiculopathy  M54.12       2. Biceps tendonitis, right  M75.21           Start Time: 0745  Stop Time: 828  Total time in clinic (min): 43 minutes    Subjective: Patient reports he has been working on his shoulder and it is feeling better than last visit.    Objective: See treatment diary below    Assessment: Patient instructed on and performed eccentric focused strengthening today for bicep and shoulder. He noted fatigue and some discomfort. Educated patient about pain vs discomfort, and when to avoid an activity or exercise and when to work through it. Patient still presents with painful and hypomobile first rib to mobilization and will continue the belt mobilization at home.    Plan: Continue per plan of care.         POC Expires Auth Status Start Date Expiration Date PT Visit Limit     No auth      Date        Used        Remaining           Diagnosis: cervical radiculopathy, R RTC tendinopathy   Precautions:    Primary Goals: 1) postural dysfunction  2) post capsule tightness  3) weakness in postural and RTC mm  4) neural tension   *asterisks by exercise = given for HEP   Manuals    PROM shoulder     KK, PTA    Post shoudler mobs    IM, PT    Cervical mobs        First rib mob R     IM, PT    KT tape support IM, PT biceps offloading Defer  IM, PT biceps offloading IM, PT biceps offloading    IASTM R anterior shoulder IM, PT       There Ex        UBE   2/2 2'/2' 2'/2'   Post shoulder S* 3x30 sec 3x30 sec  3x30 sec  3x30 sec     Sleeper S*        Doorway S     3x30 sec    Median nerve glide        UT S*        Levator S*        First rib self mob with belt 10x10 sec 10x10 sec  10x10 sec home    Cane shoulder ext stretch 10x10 sec 10x10 sec  10x10 sec     Neuro Re-Ed        Scap retraction*         Chin tucks*        Prone ITY        SA punches   Cane x 10 AROM     Chin tuck with OP into ext        TB row/ext [place]   Rows 25# 3x10   Ext 15# 3x10 [place]   Rows: 25# 3x10  Ext: 15# 3x10  [place]   Rows: 25# 4x10  Ext: 15# 4x10     SL series  ER: 2# 3x10  ER: 2# 3x10  ER: 2# x10  Abd 0# 3x10  ER: 2# 3x10  Flex:2# P!  Flex:0# with scap retraction 10x  Abd:2# 3x10    TB ER/IR  GTB 2x10 jay       Scap stab ball on wall        No money  BTB 3x10  Hold, pain  BTB 2x12    Jay h-abd   BTB 3x10  BT 3x10  BTB 2x12   Supine Tb abduction with Overhead reach        Scap stab with tband   OTB 30 sec x4  OTB 30 sec      Wall clocks  OTB 5 points 5x jay    OTB 5 points 3x jay   Biceps eccentrics    3# DB 2x15 ecc only    Wall walks with LT reach  OTB 10x jay    OTB 10x    D2 flx    Supine D2 flx YTB eccentric only 2x10    Prone ITY Bent over at bench   I 5# 2x10 Bent over at bend I 5# 2x10  Bent over at bench I 5# 2x10  Bent over at bench I 5# 2x10  2x10 ea with scap retraction            Patient education         Re-evaluation         Ther Act                          Modalities           10 min ice

## 2025-07-14 ENCOUNTER — APPOINTMENT (OUTPATIENT)
Dept: PHYSICAL THERAPY | Facility: CLINIC | Age: 60
End: 2025-07-14
Attending: STUDENT IN AN ORGANIZED HEALTH CARE EDUCATION/TRAINING PROGRAM
Payer: COMMERCIAL

## 2025-07-16 ENCOUNTER — OFFICE VISIT (OUTPATIENT)
Dept: PHYSICAL THERAPY | Facility: CLINIC | Age: 60
End: 2025-07-16
Attending: STUDENT IN AN ORGANIZED HEALTH CARE EDUCATION/TRAINING PROGRAM
Payer: COMMERCIAL

## 2025-07-16 DIAGNOSIS — M75.21 BICEPS TENDONITIS, RIGHT: ICD-10-CM

## 2025-07-16 DIAGNOSIS — M54.12 CERVICAL RADICULOPATHY: Primary | ICD-10-CM

## 2025-07-16 PROCEDURE — 97112 NEUROMUSCULAR REEDUCATION: CPT | Performed by: PHYSICAL THERAPIST

## 2025-07-16 PROCEDURE — 97110 THERAPEUTIC EXERCISES: CPT | Performed by: PHYSICAL THERAPIST

## 2025-07-16 PROCEDURE — 97140 MANUAL THERAPY 1/> REGIONS: CPT | Performed by: PHYSICAL THERAPIST

## 2025-07-16 NOTE — PROGRESS NOTES
Daily Note     Today's date: 2025  Patient name: Dion Mtz  : 1965  MRN: [id number]  Referring provider: William Waters MD  Dx:   Encounter Diagnosis     ICD-10-CM    1. Cervical radiculopathy  M54.12       2. Biceps tendonitis, right  M75.21           Start Time: 0745  Stop Time: 0830  Total time in clinic (min): 45 minutes    Subjective: Patient reports he is having a good day.     Objective: See treatment diary below    Assessment: Patient was able to tolerate most exercises well this morning, but has specific motions and activities that continue to irritate anterior shoulder. Deferred several exercises if pain was experienced. He was able to progress bent over ITY today with good form. Education provided about continuation of stretching and adding cane shoulder ext S to program at home.    Plan: Progress note during next visit.         POC Expires Auth Status Start Date Expiration Date PT Visit Limit     No auth      Date        Used        Remaining           Diagnosis: cervical radiculopathy, R RTC tendinopathy   Precautions:    Primary Goals: 1) postural dysfunction  2) post capsule tightness  3) weakness in postural and RTC mm  4) neural tension   *asterisks by exercise = given for HEP   Manuals  7 7   PROM shoulder        Post shoudler mobs    IM, PT    Cervical mobs        First rib mob R     IM, PT    KT tape support IM, PT biceps offloading Defer  IM, PT biceps offloading IM, PT biceps offloading declined   IASTM R anterior shoulder IM, PT       There Ex        UBE   2/2 2'/2' 2'/2'   Post shoulder S* 3x30 sec 3x30 sec  3x30 sec  3x30 sec     Sleeper S*        Doorway S        Median nerve glide        UT S*     HEP   Levator S*     HEP   First rib self mob with belt 10x10 sec 10x10 sec  10x10 sec home 10x10 sec   Cane shoulder ext stretch 10x10 sec 10x10 sec  10x10 sec  10x10 sec   Neuro Re-Ed        Scap retraction*        Chin tucks*        Prone ITY        SA  punches   Cane x 10 AROM     Chin tuck with OP into ext        TB row/ext [place]   Rows 25# 3x10   Ext 15# 3x10 [place]   Rows: 25# 3x10  Ext: 15# 3x10  Delvin   Rows: 25# 4x10  Ext: 15# 4x10     SL series  ER: 2# 3x10  ER: 2# 3x10  ER: 2# x10  Abd 0# 3x10  ER: 0# 3x10  Abd 0# 3x10    TB ER/IR  GTB 2x10 jay       Scap stab ball on wall        No money  BTB 3x10  Hold, pain     Jay h-abd   BTB 3x10  BT 3x10     Supine Tb abduction with Overhead reach        Scap stab with tband   OTB 30 sec x4  OTB 30 sec   OTB 30 sec   Wall clocks  OTB 5 points 5x jay    P!   Biceps eccentrics    3# DB 2x15 ecc only 4# DB 2x15 ecc only   Wall walks with LT reach  OTB 10x jay       D2 flx    Supine D2 flx YTB eccentric only 2x10 P!   Prone ITY Bent over at bench   I 5# 2x10 Bent over at bend I 5# 2x10  Bent over at bench I 5# 2x10  Bent over at bench I 5# 2x10  Bent over at bench   I 5# 2x10   2# T 2x10  0# Y 2x10           Patient education         Re-evaluation         Ther Act                          Modalities           10 min ice

## 2025-07-21 ENCOUNTER — EVALUATION (OUTPATIENT)
Dept: PHYSICAL THERAPY | Facility: CLINIC | Age: 60
End: 2025-07-21
Attending: STUDENT IN AN ORGANIZED HEALTH CARE EDUCATION/TRAINING PROGRAM
Payer: COMMERCIAL

## 2025-07-21 DIAGNOSIS — M75.21 BICEPS TENDONITIS, RIGHT: ICD-10-CM

## 2025-07-21 DIAGNOSIS — M54.12 CERVICAL RADICULOPATHY: Primary | ICD-10-CM

## 2025-07-21 PROCEDURE — 97112 NEUROMUSCULAR REEDUCATION: CPT | Performed by: PHYSICAL THERAPIST

## 2025-07-21 NOTE — PROGRESS NOTES
PT Re-Evaluation     Today's date: 2025  Patient name: Dion Mtz  : 1965  MRN: [id number]  Referring provider: William Waters MD  Dx:   Encounter Diagnosis     ICD-10-CM    1. Cervical radiculopathy  M54.12 PT plan of care cert/re-cert      2. Biceps tendonitis, right  M75.21 PT plan of care cert/re-cert            Start Time: 0745  Stop Time: 08  Total time in clinic (min): 45 minutes    Assessment  Impairments: abnormal muscle firing, abnormal muscle tone, abnormal or restricted ROM, abnormal movement, impaired physical strength, lacks appropriate home exercise program, pain with function and poor posture     Assessment details: Dion Mtz is a pleasant 60 y.o. male who presents to RE with s/s consistent with R RTC tendinopathy and biceps tendinitis. His cervical radiculopathy has resolved at this time. He is making steady progress with strength, mobility, and function since IE, but continues to require skilled PT to address weakness, posture, and functional mobility.    Primary movement impairment diagnosis of postural dysfunction resulting in pathoanatomical symptoms of Biceps tendonitis, right  (primary encounter diagnosis) and limiting his ability to carry, drive, exercise or recreation, go to work, lift, look up, perform household chores, perform yard work, reach overhead, sit, sleep, turn to look over shoulder, and wash behind the back.    Impairments include:  1) postural dysfunction  2) post capsule tightness  3) weakness in postural and RTC mm  4) neural tension    Etiologic factors include repetitive poor body mechanics.    Discussed risks, benefits, and alternatives to treatment, and answered all patient questions to patient satisfaction.  Understanding of Dx/Px/POC: good     Prognosis: good    Goals  Impairment Goals 4-6 weeks  - Decrease pain to <3/10 - partially met  - Improve shoulder AROM to equal to the unaffected upper extremity - met  - Increase shoulder strength  to 4+/5 throughout - partially met  - Increase scapular strength to 4+/5 throughout - partially met    Functional Goals 6-8 weeks  - Return to Prior Level of Function - partially met  - Patient will be independent with HEP - met  - Patient will be able to reach overhead without increased pain/compensation/difficulty - met  - Patient will be able to drive without increased pain/compensation/difficulty - met, considering AC  - Patient will be able to lift with proper mechanics  - partially met  - Patient will be able to work without increase in discomfort - met    Plan  Patient would benefit from: skilled physical therapy    Planned therapy interventions: abdominal trunk stabilization, behavior modification, body mechanics training, breathing training, flexibility, functional ROM exercises, home exercise program, joint mobilization, manual therapy, massage, Gibbons taping, muscle pump exercises, neuromuscular re-education, patient education, postural training, strengthening, stretching, therapeutic activities, therapeutic exercise and therapeutic training    Frequency: 2x week  Duration in weeks: 4  Treatment plan discussed with: patient  Plan details: Prognosis above is given PT services 2x/week tapering to 1x/week over the next month and home program adherence.        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 Recently, over the past 6-9 months it started bothering his shoulder and arm. In the last 45 days it has gotten progressively worse. He wants to stop being in pain. He saw Dr. PASTRANA who diagnosed him with RTC tendonitis and biceps tendonitis.    Update:  Patient reports that he continues to note improvements. He is at  90% improvement overall. He no longer has tingling or pain in anterior forearm and upper arm. He notices that the stretches help a lot at home. He has had pain maybe 4 times in the last month where it used to be daily. He is able to do work without pain.   PAIN LOCATION/DESCRIPTORS: R anterior shoulder  AGGRAVATING FACTORS: cold, heavy use of biceps  Improved: lifting something light, driving, lifting overhead, heavy lifting, looking down or head position, exercising, laying on the arm  EASES: sleeve  DAY PATTERN: not consistent  IMAGING:  x-ray negative  SPECIAL QUESTIONS:    Dion denies a new onset of Dizziness, Dysphagia, Dysarthria, Drop attacks, Diplopia, Nausea, Ataxia, Constant night pain, History of cancer, and Saddle anesthesia .  PATIENT GOALS: Patient wishes to resolve his pain in his arm. - Patient rates himself at 90% improved    Patient Goals  Patient goals for therapy: decreased pain, increased motion, increased strength, independence with ADLs/IADLs and return to sport/leisure activities    Pain  Current pain ratin  At best pain ratin  At worst pain rating: 3  Location: R arm  Quality: dull ache  Progression: improved          Objective     Postural Observations  Seated posture: fair  Standing posture: fair    Additional Postural Observation Details  No pain at rest  Uses towel behind his back driving    Active Range of Motion   Cervical/Thoracic Spine       Cervical    Flexion: 40 degrees  WFL  Extension: 40 degrees     WFL  Left lateral flexion: 30 degrees      Right lateral flexion: 30 degrees      Left rotation: 70 degrees  Right rotation: 65 degrees       Thoracic    Flexion:  WFL  Extension:  WFL  Left rotation:  WFL  Right rotation:  WFL    Passive Range of Motion     Right Shoulder   Flexion: WFL  Abduction: WFL  External rotation 90°: WFL  Internal rotation 90°: 55 degrees     Strength/Myotome Testing     Left Shoulder     Planes of Motion   Flexion: 4   Abduction: 4   External  rotation at 0°: 4     Right Shoulder     Planes of Motion   Flexion: 4 (discomfort)   Abduction: 4- (discomfort)   External rotation at 0°: 4 (discomfort)     Isolated Muscles   Lower trapezius: 4-   Middle trapezius: 4   Upper trapezius: 4+     Left Elbow   Flexion: 4+  Extension: 4+    Right Elbow   Flexion: 4+  Extension: 4+    Left Wrist/Hand   Wrist extension: 4+  Wrist flexion: 4+  Thumb extension: 4+    Right Wrist/Hand   Wrist extension: 4+  Wrist flexion: 4+  Thumb extension: 4+    Tests   Cervical   Positive vertical compression and cervical distraction test.  Negative alar ligament test and Sharp-Bob test.     Left   Positive Spurling's Test A.     Right   Positive Spurling's Test A.     Left Shoulder   Negative ULTT1.     Right Shoulder   Positive Hawkin's and passive horizontal adduction.   Negative Neer's, painful arc, Speed's, ULTT1, ULTT3, ULTT4, Yergason's and Peng's.     Lumbar   Positive vertical compression .               POC Expires Auth Status Start Date Expiration Date PT Visit Limit     No auth      Date        Used        Remaining           Diagnosis: cervical radiculopathy, R RTC tendinopathy   Precautions:    Primary Goals: 1) postural dysfunction  2) post capsule tightness  3) weakness in postural and RTC mm  4) neural tension   *asterisks by exercise = given for HEP   Manuals 7/21 7/2 7/7 7/9 7/16   PROM shoulder        Post shoudler mobs    IM, PT    Cervical mobs        First rib mob R     IM, PT    KT tape support  Defer  IM, PT biceps offloading IM, PT biceps offloading declined   IASTM        There Ex        UBE   2/2 2'/2' 2'/2'   Post shoulder S*  3x30 sec  3x30 sec  3x30 sec     Sleeper S*        Doorway S        Median nerve glide        UT S*     HEP   Levator S*     HEP   First rib self mob with belt  10x10 sec  10x10 sec home 10x10 sec   Cane shoulder ext stretch  10x10 sec  10x10 sec  10x10 sec   Neuro Re-Ed        Scap retraction*        Chin tucks*        Prone ITY         SA punches   Cane x 10 AROM     Chin tuck with OP into ext        RTC isometrics* 5 sec x 10 flx/abd/ER       TB row/ext  Delvin   Rows: 25# 3x10  Ext: 15# 3x10  [place]   Rows: 25# 4x10  Ext: 15# 4x10     SL series  ER: 2# 3x10  ER: 2# 3x10  ER: 2# x10  Abd 0# 3x10  ER: 0# 3x10  Abd 0# 3x10    TB ER/IR  GTB 2x10 jay       Scap stab ball on wall        No money  BTB 3x10  Hold, pain     Jay h-abd   BTB 3x10  BT 3x10     Supine Tb abduction with Overhead reach        Scap stab with tband   OTB 30 sec x4  OTB 30 sec   OTB 30 sec   Wall clocks  OTB 5 points 5x jay    P!   Biceps eccentrics    3# DB 2x15 ecc only 4# DB 2x15 ecc only   Wall walks with LT reach  OTB 10x jay       D2 flx    Supine D2 flx YTB eccentric only 2x10 P!   Prone ITY  Bent over at bend I 5# 2x10  Bent over at bench I 5# 2x10  Bent over at bench I 5# 2x10  Bent over at bench   I 5# 2x10   2# T 2x10  0# Y 2x10           Patient education         Re-evaluation IM, PT        Ther Act                          Modalities

## 2025-07-23 ENCOUNTER — OFFICE VISIT (OUTPATIENT)
Dept: PHYSICAL THERAPY | Facility: CLINIC | Age: 60
End: 2025-07-23
Attending: STUDENT IN AN ORGANIZED HEALTH CARE EDUCATION/TRAINING PROGRAM
Payer: COMMERCIAL

## 2025-07-23 DIAGNOSIS — M54.12 CERVICAL RADICULOPATHY: Primary | ICD-10-CM

## 2025-07-23 DIAGNOSIS — M75.21 BICEPS TENDONITIS, RIGHT: ICD-10-CM

## 2025-07-23 PROCEDURE — 97112 NEUROMUSCULAR REEDUCATION: CPT

## 2025-07-23 PROCEDURE — 97110 THERAPEUTIC EXERCISES: CPT

## 2025-07-23 NOTE — PROGRESS NOTES
Daily Note     Today's date: 2025  Patient name: Dion Mtz  : 1965  MRN: [id number]  Referring provider: William Waters MD  Dx:   Encounter Diagnosis     ICD-10-CM    1. Cervical radiculopathy  M54.12       2. Biceps tendonitis, right  M75.21           Start Time: 0745  Stop Time: 08  Total time in clinic (min): 46 minutes    Subjective: Patient states he has no complaints today.       Objective: See treatment diary below      Assessment: Continued with outlined program. Patient notes some pain intermittently throughout session. He exhibits good technique but still c/o anterior shoulder pain. Dicussed benefits of injection which patient is contemplating but despite being stronger and more functional he does have pain which is unpredictable throughout the day. He states he has a little pain to end session.      Plan: Continue per plan of care.       POC Expires Auth Status Start Date Expiration Date PT Visit Limit     No auth      Date        Used        Remaining           Diagnosis: cervical radiculopathy, R RTC tendinopathy   Precautions:    Primary Goals: 1) postural dysfunction  2) post capsule tightness  3) weakness in postural and RTC mm  4) neural tension   *asterisks by exercise = given for HEP   Manuals  7   PROM shoulder        Post shoudler mobs    IM, PT    Cervical mobs        First rib mob R     IM, PT    KT tape support   IM, PT biceps offloading IM, PT biceps offloading declined   IASTM        There Ex        UBE  2/2  2/2 2'/2' 2'/2'   Post shoulder S*  3x30 sec  3x30 sec  3x30 sec     Sleeper S*        Doorway S        Median nerve glide        UT S*     HEP   Levator S*     HEP   First rib self mob with belt  10x10 sec  10x10 sec home 10x10 sec   Cane shoulder ext stretch  10x10 sec  10x10 sec  10x10 sec   Neuro Re-Ed        Scap retraction*        Chin tucks*        Prone ITY        SA punches   Cane x 10 AROM     Chin tuck with OP into ext         RTC isometrics* 5 sec x 10 flx/abd/ER 5 sec x10 flex/abd/ER      TB row/ext   Delvin   Rows: 25# 4x10  Ext: 15# 4x10     SL series  ER: 0# 3x10  Abd: 0# 3x10  ER: 2# 3x10  ER: 2# x10  Abd 0# 3x10  ER: 0# 3x10  Abd 0# 3x10    TB ER/IR        Scap stab ball on wall        No money   Hold, pain     Jay h-abd    BT 3x10     Supine Tb abduction with Overhead reach        Scap stab with tband   OTB 30 sec  OTB 30 sec   OTB 30 sec   Wall clocks     P!   Biceps eccentrics  4# DB 3x15 ecc only   3# DB 2x15 ecc only 4# DB 2x15 ecc only   Wall walks with LT reach        D2 flx    Supine D2 flx YTB eccentric only 2x10 P!   Prone ITY  Bent over at bench  I 5# 2x10  2# T 2x10   1# Y 2x10  Bent over at bench I 5# 2x10  Bent over at bench I 5# 2x10  Bent over at bench   I 5# 2x10   2# T 2x10  0# Y 2x10           Patient education  KK, PTA        Re-evaluation IM, PT        Ther Act                          Modalities

## 2025-07-28 ENCOUNTER — OFFICE VISIT (OUTPATIENT)
Dept: PHYSICAL THERAPY | Facility: CLINIC | Age: 60
End: 2025-07-28
Attending: STUDENT IN AN ORGANIZED HEALTH CARE EDUCATION/TRAINING PROGRAM
Payer: COMMERCIAL

## 2025-07-28 DIAGNOSIS — M54.12 CERVICAL RADICULOPATHY: Primary | ICD-10-CM

## 2025-07-28 DIAGNOSIS — M75.21 BICEPS TENDONITIS, RIGHT: ICD-10-CM

## 2025-07-28 PROCEDURE — 97110 THERAPEUTIC EXERCISES: CPT

## 2025-07-28 PROCEDURE — 97140 MANUAL THERAPY 1/> REGIONS: CPT

## 2025-07-28 PROCEDURE — 97112 NEUROMUSCULAR REEDUCATION: CPT

## 2025-07-30 ENCOUNTER — OFFICE VISIT (OUTPATIENT)
Dept: PHYSICAL THERAPY | Facility: CLINIC | Age: 60
End: 2025-07-30
Attending: STUDENT IN AN ORGANIZED HEALTH CARE EDUCATION/TRAINING PROGRAM
Payer: COMMERCIAL

## 2025-07-30 DIAGNOSIS — M75.21 BICEPS TENDONITIS, RIGHT: ICD-10-CM

## 2025-07-30 DIAGNOSIS — M54.12 CERVICAL RADICULOPATHY: Primary | ICD-10-CM

## 2025-07-30 PROCEDURE — 97110 THERAPEUTIC EXERCISES: CPT

## 2025-07-30 PROCEDURE — 97140 MANUAL THERAPY 1/> REGIONS: CPT

## 2025-07-30 PROCEDURE — 97112 NEUROMUSCULAR REEDUCATION: CPT

## 2025-08-04 ENCOUNTER — OFFICE VISIT (OUTPATIENT)
Dept: PHYSICAL THERAPY | Facility: CLINIC | Age: 60
End: 2025-08-04
Attending: STUDENT IN AN ORGANIZED HEALTH CARE EDUCATION/TRAINING PROGRAM
Payer: COMMERCIAL

## 2025-08-04 DIAGNOSIS — M75.21 BICEPS TENDONITIS, RIGHT: ICD-10-CM

## 2025-08-04 DIAGNOSIS — M54.12 CERVICAL RADICULOPATHY: Primary | ICD-10-CM

## 2025-08-04 PROCEDURE — 97112 NEUROMUSCULAR REEDUCATION: CPT

## 2025-08-07 ENCOUNTER — OFFICE VISIT (OUTPATIENT)
Dept: PHYSICAL THERAPY | Facility: CLINIC | Age: 60
End: 2025-08-07
Attending: STUDENT IN AN ORGANIZED HEALTH CARE EDUCATION/TRAINING PROGRAM
Payer: COMMERCIAL

## 2025-08-07 DIAGNOSIS — M54.12 CERVICAL RADICULOPATHY: Primary | ICD-10-CM

## 2025-08-07 DIAGNOSIS — M75.21 BICEPS TENDONITIS, RIGHT: ICD-10-CM

## 2025-08-07 PROCEDURE — 97110 THERAPEUTIC EXERCISES: CPT | Performed by: PHYSICAL THERAPIST

## 2025-08-07 PROCEDURE — 97140 MANUAL THERAPY 1/> REGIONS: CPT | Performed by: PHYSICAL THERAPIST

## 2025-08-07 PROCEDURE — 97112 NEUROMUSCULAR REEDUCATION: CPT | Performed by: PHYSICAL THERAPIST

## 2025-08-14 ENCOUNTER — OFFICE VISIT (OUTPATIENT)
Dept: PHYSICAL THERAPY | Facility: CLINIC | Age: 60
End: 2025-08-14
Attending: STUDENT IN AN ORGANIZED HEALTH CARE EDUCATION/TRAINING PROGRAM
Payer: COMMERCIAL

## 2025-08-18 ENCOUNTER — EVALUATION (OUTPATIENT)
Dept: PHYSICAL THERAPY | Facility: CLINIC | Age: 60
End: 2025-08-18
Attending: STUDENT IN AN ORGANIZED HEALTH CARE EDUCATION/TRAINING PROGRAM
Payer: COMMERCIAL

## 2025-08-18 DIAGNOSIS — M75.21 BICEPS TENDONITIS, RIGHT: Primary | ICD-10-CM

## 2025-08-18 DIAGNOSIS — M54.12 CERVICAL RADICULOPATHY: ICD-10-CM

## 2025-08-18 PROCEDURE — 97112 NEUROMUSCULAR REEDUCATION: CPT | Performed by: PHYSICAL THERAPIST

## 2025-08-18 PROCEDURE — 97140 MANUAL THERAPY 1/> REGIONS: CPT | Performed by: PHYSICAL THERAPIST
